# Patient Record
Sex: FEMALE | Race: WHITE | NOT HISPANIC OR LATINO | Employment: OTHER | URBAN - METROPOLITAN AREA
[De-identification: names, ages, dates, MRNs, and addresses within clinical notes are randomized per-mention and may not be internally consistent; named-entity substitution may affect disease eponyms.]

---

## 2017-02-13 ENCOUNTER — TRANSCRIBE ORDERS (OUTPATIENT)
Dept: ADMINISTRATIVE | Facility: HOSPITAL | Age: 62
End: 2017-02-13

## 2017-02-13 DIAGNOSIS — Z13.9 SCREENING: Primary | ICD-10-CM

## 2017-02-14 ENCOUNTER — TRANSCRIBE ORDERS (OUTPATIENT)
Dept: ADMINISTRATIVE | Facility: HOSPITAL | Age: 62
End: 2017-02-14

## 2017-02-14 ENCOUNTER — HOSPITAL ENCOUNTER (OUTPATIENT)
Dept: GENERAL RADIOLOGY | Facility: HOSPITAL | Age: 62
Discharge: HOME | End: 2017-02-14
Admitting: FAMILY MEDICINE

## 2017-02-14 DIAGNOSIS — S82.402A CLOSED FRACTURE OF SHAFT OF LEFT FIBULA, UNSPECIFIED FRACTURE MORPHOLOGY, INITIAL ENCOUNTER: ICD-10-CM

## 2017-02-14 DIAGNOSIS — S82.402A CLOSED FRACTURE OF SHAFT OF LEFT FIBULA, UNSPECIFIED FRACTURE MORPHOLOGY, INITIAL ENCOUNTER: Primary | ICD-10-CM

## 2017-02-14 PROCEDURE — 73610 X-RAY EXAM OF ANKLE: CPT

## 2017-02-16 ENCOUNTER — HOSPITAL ENCOUNTER (OUTPATIENT)
Dept: MAMMOGRAPHY | Facility: HOSPITAL | Age: 62
Discharge: HOME | End: 2017-02-16
Attending: OBSTETRICS & GYNECOLOGY | Admitting: OBSTETRICS & GYNECOLOGY

## 2017-02-16 DIAGNOSIS — Z13.9 SCREENING: ICD-10-CM

## 2017-02-16 PROCEDURE — G0202 SCR MAMMO BI INCL CAD: HCPCS

## 2017-02-16 PROCEDURE — 77063 BREAST TOMOSYNTHESIS BI: CPT

## 2017-05-16 ENCOUNTER — HOSPITAL ENCOUNTER (OUTPATIENT)
Dept: GENERAL RADIOLOGY | Facility: HOSPITAL | Age: 62
Discharge: HOME | End: 2017-05-16
Admitting: FAMILY MEDICINE

## 2017-05-16 ENCOUNTER — TRANSCRIBE ORDERS (OUTPATIENT)
Dept: ADMINISTRATIVE | Facility: HOSPITAL | Age: 62
End: 2017-05-16

## 2017-05-16 DIAGNOSIS — R52 PAIN: ICD-10-CM

## 2017-05-16 DIAGNOSIS — R52 PAIN: Primary | ICD-10-CM

## 2017-05-16 PROCEDURE — 73630 X-RAY EXAM OF FOOT: CPT

## 2018-05-02 ENCOUNTER — TRANSCRIBE ORDERS (OUTPATIENT)
Dept: ADMINISTRATIVE | Facility: HOSPITAL | Age: 63
End: 2018-05-02

## 2018-05-02 DIAGNOSIS — Z12.31 VISIT FOR SCREENING MAMMOGRAM: Primary | ICD-10-CM

## 2018-05-08 ENCOUNTER — HOSPITAL ENCOUNTER (OUTPATIENT)
Dept: MAMMOGRAPHY | Facility: HOSPITAL | Age: 63
Discharge: HOME | End: 2018-05-08
Attending: OBSTETRICS & GYNECOLOGY | Admitting: OBSTETRICS & GYNECOLOGY

## 2018-05-08 DIAGNOSIS — Z12.31 VISIT FOR SCREENING MAMMOGRAM: ICD-10-CM

## 2018-05-08 PROCEDURE — 77063 BREAST TOMOSYNTHESIS BI: CPT

## 2018-05-08 PROCEDURE — 77067 SCR MAMMO BI INCL CAD: CPT

## 2018-05-18 ENCOUNTER — OFFICE VISIT (OUTPATIENT)
Dept: OBSTETRICS AND GYNECOLOGY | Facility: CLINIC | Age: 63
End: 2018-05-18

## 2018-05-18 VITALS
SYSTOLIC BLOOD PRESSURE: 154 MMHG | WEIGHT: 190 LBS | HEIGHT: 64 IN | DIASTOLIC BLOOD PRESSURE: 90 MMHG | BODY MASS INDEX: 32.44 KG/M2

## 2018-05-18 DIAGNOSIS — Z11.51 SPECIAL SCREENING EXAMINATION FOR HUMAN PAPILLOMAVIRUS (HPV): ICD-10-CM

## 2018-05-18 DIAGNOSIS — R32 URINARY INCONTINENCE, UNSPECIFIED TYPE: ICD-10-CM

## 2018-05-18 DIAGNOSIS — Z01.419 PAP SMEAR, LOW-RISK: ICD-10-CM

## 2018-05-18 DIAGNOSIS — Z01.419 ENCOUNTER FOR GYNECOLOGICAL EXAMINATION WITHOUT ABNORMAL FINDING: Primary | ICD-10-CM

## 2018-05-18 LAB
BILIRUB BLD-MCNC: NEGATIVE MG/DL
CLARITY, POC: CLEAR
COLOR UR: YELLOW
GLUCOSE UR STRIP-MCNC: NEGATIVE MG/DL
KETONES UR QL: NEGATIVE
LEUKOCYTE EST, POC: NEGATIVE
NITRITE UR-MCNC: NEGATIVE MG/ML
PH UR: 5 [PH] (ref 5–8)
PROT UR STRIP-MCNC: NEGATIVE MG/DL
RBC # UR STRIP: NEGATIVE /UL
SP GR UR: 1 (ref 1–1.03)
UROBILINOGEN UR QL: NORMAL

## 2018-05-18 PROCEDURE — 81002 URINALYSIS NONAUTO W/O SCOPE: CPT | Performed by: OBSTETRICS & GYNECOLOGY

## 2018-05-18 PROCEDURE — 99386 PREV VISIT NEW AGE 40-64: CPT | Performed by: OBSTETRICS & GYNECOLOGY

## 2018-05-18 RX ORDER — ROSUVASTATIN CALCIUM 10 MG/1
10 TABLET, COATED ORAL DAILY
COMMUNITY
Start: 2018-05-07 | End: 2021-11-18 | Stop reason: SDUPTHER

## 2018-05-18 RX ORDER — OMEPRAZOLE 40 MG/1
40 CAPSULE, DELAYED RELEASE ORAL DAILY
COMMUNITY
Start: 2018-04-29 | End: 2018-11-27 | Stop reason: SDUPTHER

## 2018-05-18 RX ORDER — LISINOPRIL 20 MG/1
20 TABLET ORAL DAILY
COMMUNITY
Start: 2018-04-29 | End: 2020-08-10

## 2018-05-18 NOTE — PROGRESS NOTES
GYN Annual Exam     CC- Here for annual exam.     Gracie Topete is a 62 y.o. female who presents for annual well woman exam. Pt is a new pt to me. Pt has had a TVH with ovarian conservation. Pt has a TVT approx 20 years ago. Pt wears a pad all the time bc she still leaks urine. Pt has OAB symptoms with urinary frequency and urgency. Pt was using estrogen cream but was afraid to use bc she is afraid to cancer.    OB History      Para Term  AB Living    2 2 2          SAB TAB Ectopic Molar Multiple Live Births                         Current contraception: post menopausal status  History of abnormal Pap smear: no  History of abnormal mammogram: no  Family history of uterine, colon or ovarian cancer: no  Family history of breast cancer: no    Health Maintenance   Topic Date Due   • ANNUAL PHYSICAL  1958   • TDAP/TD VACCINES (1 - Tdap) 1974   • ZOSTER VACCINE (1 of 2) 2005   • HEPATITIS C SCREENING  2018   • LIPID PANEL  2018   • COLONOSCOPY  2018   • INFLUENZA VACCINE  2018   • MAMMOGRAM  2020   • PAP SMEAR  2021       Past Medical History:   Diagnosis Date   • Basal cell carcinoma of chest     NECK, LEGS   • GERD (gastroesophageal reflux disease)    • Hyperlipidemia    • Hypertension        Past Surgical History:   Procedure Laterality Date   • EYE SURGERY     • HYSTERECTOMY     • ROTATOR CUFF REPAIR     • SKIN TAG REMOVAL     • TUBAL ABDOMINAL LIGATION           Current Outpatient Prescriptions:   •  lisinopril (PRINIVIL,ZESTRIL) 20 MG tablet, , Disp: , Rfl:   •  omeprazole (priLOSEC) 40 MG capsule, , Disp: , Rfl:   •  rosuvastatin (CRESTOR) 10 MG tablet, , Disp: , Rfl:     No Known Allergies    Social History   Substance Use Topics   • Smoking status: Former Smoker   • Smokeless tobacco: Not on file   • Alcohol use No       History reviewed. No pertinent family history.    Review of Systems   Gastrointestinal: Negative for abdominal distention and  "abdominal pain.   Genitourinary: Positive for frequency and urgency. Negative for dysuria, vaginal bleeding, vaginal discharge and vaginal pain.       /90   Ht 162.6 cm (64\")   Wt 86.2 kg (190 lb)   Breastfeeding? No   BMI 32.61 kg/m²     Physical Exam   Constitutional: She is oriented to person, place, and time. She appears well-developed and well-nourished.   HENT:   Mouth/Throat: Normal dentition. No dental caries.   Cardiovascular: Normal rate and regular rhythm.    Pulmonary/Chest: Effort normal and breath sounds normal. Right breast exhibits no inverted nipple, no mass, no nipple discharge, no skin change and no tenderness. Left breast exhibits no inverted nipple, no mass, no nipple discharge, no skin change and no tenderness.   Abdominal: Soft. She exhibits no distension and no mass. There is no tenderness.   Genitourinary: There is no rash, tenderness or lesion on the right labia. There is no rash, tenderness or lesion on the left labia. Right adnexum displays no mass, no tenderness and no fullness. Left adnexum displays no mass, no tenderness and no fullness. No tenderness or bleeding in the vagina. No vaginal discharge found.   Genitourinary Comments: No evidence of mesh erosion   Neurological: She is alert and oriented to person, place, and time.   Psychiatric: She has a normal mood and affect. Her behavior is normal. Judgment and thought content normal.   Vitals reviewed.         Assessment/Plan    1) GYN HM: Check pap smear- hx of TVH. UTD with colonoscopy. MMG UTD. SBE demonstrated and encouraged.  2) Urinary incontinence: Hx of TVT. Pt has leakage that sounds like OAB symptoms. Pt declines medication at this time. Discussed starting meds if symptoms worsen for her. Pt was using estrogen cream to prevent erosion of TVT but stopped bc she worries about estrogen cream causing cancer. Dsicussed the limited systemic effects of estrogen cream  3) Bone health - Weight bearing exercise, dietary " calcium recommendations and vitamin D reviewed.   4) Diet and Exercise discussed  5) Smoking Status: nonsmoker  6) Social: Pt;s sister is also my pt.  7) Follow up prn and 1 year       Diagnoses and all orders for this visit:    Encounter for gynecological examination without abnormal finding    Pap smear, low-risk  -     POC Urinalysis Dipstick  -     Pap IG, HPV-hr    Special screening examination for human papillomavirus (HPV)  -     POC Urinalysis Dipstick  -     Pap IG, HPV-hr    Urinary incontinence, unspecified type    Other orders  -     lisinopril (PRINIVIL,ZESTRIL) 20 MG tablet;   -     omeprazole (priLOSEC) 40 MG capsule;   -     rosuvastatin (CRESTOR) 10 MG tablet;         Jannet Nesbitt,   5/24/2018  6:30 AM

## 2018-05-22 LAB
CYTOLOGIST CVX/VAG CYTO: NORMAL
CYTOLOGY CVX/VAG DOC THIN PREP: NORMAL
DX ICD CODE: NORMAL
HIV 1 & 2 AB SER-IMP: NORMAL
HPV I/H RISK 1 DNA CVX QL PROBE+SIG AMP: NEGATIVE
OTHER STN SPEC: NORMAL
PATH REPORT.FINAL DX SPEC: NORMAL
STAT OF ADQ CVX/VAG CYTO-IMP: NORMAL

## 2018-05-24 PROBLEM — R32 URINARY INCONTINENCE: Status: ACTIVE | Noted: 2018-05-24

## 2018-08-17 ENCOUNTER — TELEPHONE (OUTPATIENT)
Dept: GASTROENTEROLOGY | Facility: CLINIC | Age: 63
End: 2018-08-17

## 2018-08-17 PROBLEM — K22.70 BARRETT'S ESOPHAGUS WITHOUT DYSPLASIA: Status: ACTIVE | Noted: 2018-08-17

## 2018-08-21 ENCOUNTER — PREP FOR SURGERY (OUTPATIENT)
Dept: OTHER | Facility: HOSPITAL | Age: 63
End: 2018-08-21

## 2018-08-21 DIAGNOSIS — Z12.11 SCREENING FOR COLON CANCER: ICD-10-CM

## 2018-08-21 DIAGNOSIS — K22.70 BARRETT'S ESOPHAGUS WITHOUT DYSPLASIA: Primary | ICD-10-CM

## 2018-08-22 NOTE — TELEPHONE ENCOUNTER
LEAVING THIS WEEKEND FOR [place] AND WILL BE GONE FOR MONTH.  WOULD FOR US TO CALL HER BACK FIRST WEEK IN October.  SENT REMINDER TO CALL HER THEN.

## 2018-10-09 ENCOUNTER — PREP FOR SURGERY (OUTPATIENT)
Dept: OTHER | Facility: HOSPITAL | Age: 63
End: 2018-10-09

## 2018-10-09 DIAGNOSIS — K22.70 BARRETT'S ESOPHAGUS WITHOUT DYSPLASIA: Primary | ICD-10-CM

## 2018-10-09 DIAGNOSIS — Z12.11 SCREENING FOR COLON CANCER: ICD-10-CM

## 2018-10-23 ENCOUNTER — ANESTHESIA EVENT (OUTPATIENT)
Dept: GASTROENTEROLOGY | Facility: HOSPITAL | Age: 63
End: 2018-10-23

## 2018-10-23 ENCOUNTER — HOSPITAL ENCOUNTER (OUTPATIENT)
Facility: HOSPITAL | Age: 63
Setting detail: HOSPITAL OUTPATIENT SURGERY
Discharge: HOME | End: 2018-10-23
Attending: INTERNAL MEDICINE | Admitting: INTERNAL MEDICINE

## 2018-10-23 ENCOUNTER — ANESTHESIA (OUTPATIENT)
Dept: GASTROENTEROLOGY | Facility: HOSPITAL | Age: 63
End: 2018-10-23

## 2018-10-23 VITALS
SYSTOLIC BLOOD PRESSURE: 135 MMHG | HEIGHT: 64 IN | TEMPERATURE: 97.9 F | WEIGHT: 184.19 LBS | RESPIRATION RATE: 20 BRPM | HEART RATE: 75 BPM | OXYGEN SATURATION: 98 % | BODY MASS INDEX: 31.45 KG/M2 | DIASTOLIC BLOOD PRESSURE: 75 MMHG

## 2018-10-23 DIAGNOSIS — Z12.11 SCREENING FOR COLON CANCER: ICD-10-CM

## 2018-10-23 DIAGNOSIS — K22.70 BARRETT'S ESOPHAGUS WITHOUT DYSPLASIA: ICD-10-CM

## 2018-10-23 PROCEDURE — 88305 TISSUE EXAM BY PATHOLOGIST: CPT | Performed by: INTERNAL MEDICINE

## 2018-10-23 PROCEDURE — 43239 EGD BIOPSY SINGLE/MULTIPLE: CPT | Performed by: INTERNAL MEDICINE

## 2018-10-23 PROCEDURE — 45380 COLONOSCOPY AND BIOPSY: CPT | Performed by: INTERNAL MEDICINE

## 2018-10-23 PROCEDURE — 25010000002 PROPOFOL 10 MG/ML EMULSION: Performed by: ANESTHESIOLOGY

## 2018-10-23 RX ORDER — SODIUM CHLORIDE 0.9 % (FLUSH) 0.9 %
3 SYRINGE (ML) INJECTION EVERY 12 HOURS SCHEDULED
Status: DISCONTINUED | OUTPATIENT
Start: 2018-10-23 | End: 2018-10-23 | Stop reason: HOSPADM

## 2018-10-23 RX ORDER — SODIUM CHLORIDE 0.9 % (FLUSH) 0.9 %
3-10 SYRINGE (ML) INJECTION AS NEEDED
Status: DISCONTINUED | OUTPATIENT
Start: 2018-10-23 | End: 2018-10-23 | Stop reason: HOSPADM

## 2018-10-23 RX ORDER — LIDOCAINE HYDROCHLORIDE 20 MG/ML
INJECTION, SOLUTION INFILTRATION; PERINEURAL AS NEEDED
Status: DISCONTINUED | OUTPATIENT
Start: 2018-10-23 | End: 2018-10-23 | Stop reason: SURG

## 2018-10-23 RX ORDER — SODIUM CHLORIDE, SODIUM LACTATE, POTASSIUM CHLORIDE, CALCIUM CHLORIDE 600; 310; 30; 20 MG/100ML; MG/100ML; MG/100ML; MG/100ML
30 INJECTION, SOLUTION INTRAVENOUS CONTINUOUS PRN
Status: DISCONTINUED | OUTPATIENT
Start: 2018-10-23 | End: 2018-10-23 | Stop reason: HOSPADM

## 2018-10-23 RX ORDER — PROPOFOL 10 MG/ML
VIAL (ML) INTRAVENOUS CONTINUOUS PRN
Status: DISCONTINUED | OUTPATIENT
Start: 2018-10-23 | End: 2018-10-23 | Stop reason: SURG

## 2018-10-23 RX ORDER — GLYCOPYRROLATE 0.2 MG/ML
INJECTION INTRAMUSCULAR; INTRAVENOUS AS NEEDED
Status: DISCONTINUED | OUTPATIENT
Start: 2018-10-23 | End: 2018-10-23 | Stop reason: SURG

## 2018-10-23 RX ORDER — PROPOFOL 10 MG/ML
VIAL (ML) INTRAVENOUS AS NEEDED
Status: DISCONTINUED | OUTPATIENT
Start: 2018-10-23 | End: 2018-10-23 | Stop reason: SURG

## 2018-10-23 RX ADMIN — PROPOFOL 140 MCG/KG/MIN: 10 INJECTION, EMULSION INTRAVENOUS at 08:11

## 2018-10-23 RX ADMIN — PROPOFOL 20 MG: 10 INJECTION, EMULSION INTRAVENOUS at 08:15

## 2018-10-23 RX ADMIN — GLYCOPYRROLATE 0.1 MCG: 0.2 INJECTION INTRAMUSCULAR; INTRAVENOUS at 07:59

## 2018-10-23 RX ADMIN — SODIUM CHLORIDE, POTASSIUM CHLORIDE, SODIUM LACTATE AND CALCIUM CHLORIDE 30 ML/HR: 600; 310; 30; 20 INJECTION, SOLUTION INTRAVENOUS at 07:40

## 2018-10-23 RX ADMIN — PROPOFOL 100 MG: 10 INJECTION, EMULSION INTRAVENOUS at 08:11

## 2018-10-23 RX ADMIN — PROPOFOL 30 MG: 10 INJECTION, EMULSION INTRAVENOUS at 08:13

## 2018-10-23 RX ADMIN — LIDOCAINE HYDROCHLORIDE 40 MG: 20 INJECTION, SOLUTION INFILTRATION; PERINEURAL at 07:59

## 2018-10-23 NOTE — ANESTHESIA POSTPROCEDURE EVALUATION
Patient: Gracie Topete    Procedure Summary     Date:  10/23/18 Room / Location:  [facility] ENDOSCOPY 9 /  [facility]    Anesthesia Start:  0757 Anesthesia Stop:  0853    Procedures:       ESOPHAGOGASTRODUODENOSCOPY WITH COLD BIOPSIES (N/A Esophagus)      COLONOSCOPY INTO CECUM AND TERMINAL ILEUM WITH COLD BIOPSY POLYPECTOMIES (N/A ) Diagnosis:       Screening for colon cancer      Murphy's esophagus without dysplasia      Colon polyp      Diverticulosis      (Screening for colon cancer [Z12.11])      (Murphy's esophagus without dysplasia [K22.70])    Surgeon:  Med Walalce MD Provider:  Ellen Jeter MD    Anesthesia Type:  MAC ASA Status:  2          Anesthesia Type: MAC  Last vitals  BP   110/73 (10/23/18 0847)   Temp   36.8 °C (98.2 °F) (10/23/18 0718)   Pulse   88 (10/23/18 0847)   Resp   20 (10/23/18 0847)     SpO2   94 % (10/23/18 0847)     Post Anesthesia Care and Evaluation    Patient location during evaluation: bedside  Patient participation: complete - patient participated  Level of consciousness: awake and alert  Pain management: adequate  Airway patency: patent  Anesthetic complications: No anesthetic complications  PONV Status: controlled  Cardiovascular status: acceptable  Respiratory status: acceptable  Hydration status: acceptable

## 2018-10-23 NOTE — H&P
"Patient Care Team:  Noah Valdez MD as PCP - General (Family Medicine)    CHIEF COMPLAINT: Murphy's, Family history of colon Polyps    HISTORY OF PRESENT ILLNESS:    Last colon was 2012 and last EGD was 2015      Past Medical History:   Diagnosis Date   • Murphy esophagus    • Basal cell carcinoma of chest     NECK, LEGS   • GERD (gastroesophageal reflux disease)    • Hyperlipidemia    • Hypertension      Past Surgical History:   Procedure Laterality Date   • COLONOSCOPY  2008   • EYE SURGERY     • HYSTERECTOMY     • ROTATOR CUFF REPAIR     • SKIN TAG REMOVAL     • TUBAL ABDOMINAL LIGATION       Family History   Problem Relation Age of Onset   • Colon polyps Mother    • Colon polyps Father    • Malig Hyperthermia Neg Hx      Social History   Substance Use Topics   • Smoking status: Former Smoker   • Smokeless tobacco: Never Used   • Alcohol use No     Prescriptions Prior to Admission   Medication Sig Dispense Refill Last Dose   • Cetirizine HCl (KLS ALLER-HUDSON PO) Take 1 tablet by mouth Daily.   10/22/2018   • lisinopril (PRINIVIL,ZESTRIL) 20 MG tablet Take 20 mg by mouth Daily.   10/21/2018   • omeprazole (priLOSEC) 40 MG capsule Take 40 mg by mouth Daily.   10/23/2018 at 0500   • rosuvastatin (CRESTOR) 10 MG tablet Take 10 mg by mouth Daily.   10/20/2018     Allergies:  Patient has no known allergies.    REVIEW OF SYSTEMS:  Please see the above history of present illness for pertinent positives and negatives.  The remainder of the patient's systems have been reviewed and are negative.     Vital Signs  Temp:  [98.2 °F (36.8 °C)] 98.2 °F (36.8 °C)  Heart Rate:  [81] 81  Resp:  [14] 14  BP: (151)/(91) 151/91    Flowsheet Rows      First Filed Value   Admission Height  162.5 cm (63.98\") Documented at 10/22/2018 1207   Admission Weight  83.5 kg (184 lb 3 oz) Documented at 10/23/2018 0718           Physical Exam:  Physical Exam   Constitutional: Patient appears well-developed and well-nourished and in no acute " distress   HEENT:   Head: Normocephalic and atraumatic.   Eyes:  Pupils are equal, round, and reactive to light. EOM are intact. Sclera are anicteric and non-injected.  Mouth and Throat: Patient has moist mucous membranes. Oropharynx is clear of any erythema or exudate.     Neck: Neck supple. No JVD present. No thyromegaly present. No lymphadenopathy present.  Cardiovascular: Regular rate, regular rhythm, S1 normal and S2 normal.  Exam reveals no gallop and no friction rub.  No murmur heard.  Pulmonary/Chest: Lungs are clear to auscultation bilaterally. No respiratory distress. No wheezes. No rhonchi. No rales.   Abdominal: Soft. Bowel sounds are normal. No distension and no mass. There is no hepatosplenomegaly. There is no tenderness.   Musculoskeletal: Normal Muscle tone  Extremities: No edema. Pulses are palpable in all 4 extremities.  Neurological: Patient is alert and oriented to person, place, and time. Cranial nerves II-XII are grossly intact with no focal deficits.  Skin: Skin is warm. No rash noted. Nails show no clubbing.  No cyanosis or erythema.    Debilities/Disabilities Identified: None  Emotional Behavior: Appropriate     Results Review:    I reviewed the patient's new clinical results.  Lab Results (most recent)     None          Imaging Results (most recent)     None        reviewed    ECG/EMG Results (most recent)     None        reviewed    Assessment/Plan     Baret's Esophagus  Family history of colon Polyps    EGD/Colonoscopy    I discussed the patients findings and my recommendations with patient.     Med Wallace MD  10/23/18  8:13 AM    Time: 10 min prior to procedure.

## 2018-10-23 NOTE — BRIEF OP NOTE
ESOPHAGOGASTRODUODENOSCOPY, COLONOSCOPY  Progress Note    Gracie Topete  10/23/2018    Pre-op Diagnosis:   Screening for colon cancer [Z12.11]  Murphy's esophagus without dysplasia [K22.70]       Post-Op Diagnosis Codes:     * Screening for colon cancer [Z12.11]     * Murphy's esophagus without dysplasia [K22.70]     * Colon polyp [K63.5]     * Diverticulosis [K57.90]    Procedure/CPT® Codes:      Procedure(s):  ESOPHAGOGASTRODUODENOSCOPY WITH COLD BIOPSIES  COLONOSCOPY INTO CECUM AND TERMINAL ILEUM WITH COLD BIOPSY POLYPECTOMIES    Surgeon(s):  Med Wallace MD    Anesthesia: Monitor Anesthesia Care    Staff:   Endo Technician: Shari Salinas  Endo Nurse: Gail Onofre RN    Estimated Blood Loss: none    Urine Voided: * No values recorded between 10/23/2018  8:00 AM and 10/23/2018  8:42 AM *    Specimens:                ID Type Source Tests Collected by Time   A : DISTAL ESOPHAGEAL BIOPSIES Tissue Esophagus, Distal TISSUE PATHOLOGY EXAM Med Wallace MD 10/23/2018 0818   B : ASCENDING COLON POLYP X2 Tissue Large Intestine, Right / Ascending Colon TISSUE PATHOLOGY EXAM Med Wallace MD 10/23/2018 0830   C : SIGMOID COLON POLYP X 2 Tissue Large Intestine, Sigmoid Colon TISSUE PATHOLOGY EXAM Med Wallace MD 10/23/2018 0838   D : RECTAL POLYP Polyp Large Intestine, Rectum TISSUE PATHOLOGY EXAM Med Wallace MD 10/23/2018 0841         Drains:      Findings: Normal Duodenum  Normal Stomach  Short Segment Murphy's Esophagus-Biopsy    Colon to TI Good Prep  Polyps (5) Cold Biopsy  Diverticulosis    Complications: None      Med Wallace MD     Date: 10/23/2018  Time: 8:43 AM

## 2018-10-24 LAB
CYTO UR: NORMAL
LAB AP CASE REPORT: NORMAL
PATH REPORT.FINAL DX SPEC: NORMAL
PATH REPORT.GROSS SPEC: NORMAL

## 2018-11-27 RX ORDER — OMEPRAZOLE 40 MG/1
40 CAPSULE, DELAYED RELEASE ORAL DAILY
Qty: 90 CAPSULE | Refills: 3 | Status: SHIPPED | OUTPATIENT
Start: 2018-11-27 | End: 2019-11-19 | Stop reason: SDUPTHER

## 2019-05-16 ENCOUNTER — TRANSCRIBE ORDERS (OUTPATIENT)
Dept: ADMINISTRATIVE | Facility: HOSPITAL | Age: 64
End: 2019-05-16

## 2019-05-16 DIAGNOSIS — Z12.31 SCREENING MAMMOGRAM, ENCOUNTER FOR: Primary | ICD-10-CM

## 2019-05-23 ENCOUNTER — OFFICE VISIT (OUTPATIENT)
Dept: OBSTETRICS AND GYNECOLOGY | Facility: CLINIC | Age: 64
End: 2019-05-23

## 2019-05-23 VITALS
BODY MASS INDEX: 31.41 KG/M2 | DIASTOLIC BLOOD PRESSURE: 70 MMHG | SYSTOLIC BLOOD PRESSURE: 124 MMHG | HEIGHT: 64 IN | WEIGHT: 184 LBS

## 2019-05-23 DIAGNOSIS — N32.81 OAB (OVERACTIVE BLADDER): ICD-10-CM

## 2019-05-23 DIAGNOSIS — Z01.419 WELL WOMAN EXAM WITH ROUTINE GYNECOLOGICAL EXAM: Primary | ICD-10-CM

## 2019-05-23 LAB
BILIRUB BLD-MCNC: NEGATIVE MG/DL
CLARITY, POC: CLEAR
COLOR UR: YELLOW
GLUCOSE UR STRIP-MCNC: NEGATIVE MG/DL
KETONES UR QL: NEGATIVE
LEUKOCYTE EST, POC: NEGATIVE
NITRITE UR-MCNC: NEGATIVE MG/ML
PH UR: 6.5 [PH] (ref 5–8)
PROT UR STRIP-MCNC: NEGATIVE MG/DL
RBC # UR STRIP: NEGATIVE /UL
SP GR UR: 1.02 (ref 1–1.03)
UROBILINOGEN UR QL: NORMAL

## 2019-05-23 PROCEDURE — 99396 PREV VISIT EST AGE 40-64: CPT | Performed by: OBSTETRICS & GYNECOLOGY

## 2019-05-23 PROCEDURE — 81002 URINALYSIS NONAUTO W/O SCOPE: CPT | Performed by: OBSTETRICS & GYNECOLOGY

## 2019-05-23 NOTE — PROGRESS NOTES
GYN Annual Exam     CC- Here for annual exam.     Gracie Topete is a 63 y.o. female who presents for annual well woman exam. Pt has had a TVH with ovarian conservation. Pt has a TVT approx 20 years ago. Pt wears a pad all the time bc she still leaks urine. Pt has OAB symptoms with urinary frequency and urgency.Pt was not interested in starting meds last year but is interested now. Reports she has started drinking less water to control her urinary frequency.    OB History      Para Term  AB Living    2 2 2          SAB TAB Ectopic Molar Multiple Live Births                         Current contraception: post menopausal status  History of abnormal Pap smear: no  History of abnormal mammogram: no  Family history of uterine, colon or ovarian cancer: no  Family history of breast cancer: no        Health Maintenance   Topic Date Due   • ANNUAL PHYSICAL  1958   • TDAP/TD VACCINES (1 - Tdap) 1974   • ZOSTER VACCINE (1 of 2) 2005   • HEPATITIS C SCREENING  2018   • LIPID PANEL  2018   • INFLUENZA VACCINE  2019   • MAMMOGRAM  2020   • ANNUAL GYN EXAM  2020   • PAP SMEAR  2021   • COLONOSCOPY  10/23/2023       Past Medical History:   Diagnosis Date   • Murphy esophagus    • Basal cell carcinoma of chest     NECK, LEGS   • GERD (gastroesophageal reflux disease)    • Hyperlipidemia    • Hypertension        Past Surgical History:   Procedure Laterality Date   • COLONOSCOPY     • COLONOSCOPY N/A 10/23/2018    Procedure: COLONOSCOPY INTO CECUM AND TERMINAL ILEUM WITH COLD BIOPSY POLYPECTOMIES;  Surgeon: Med Wallace MD;  Location: [facility] ENDOSCOPY;  Service: Gastroenterology   • ENDOSCOPY N/A 10/23/2018    Procedure: ESOPHAGOGASTRODUODENOSCOPY WITH COLD BIOPSIES;  Surgeon: Med Wallace MD;  Location: [facility] ENDOSCOPY;  Service: Gastroenterology   • EYE SURGERY     • HYSTERECTOMY     • ROTATOR CUFF REPAIR     • SKIN TAG REMOVAL    "  • TUBAL ABDOMINAL LIGATION           Current Outpatient Medications:   •  Cetirizine HCl (KLS ALLER-HUDSON PO), Take 1 tablet by mouth Daily., Disp: , Rfl:   •  lisinopril (PRINIVIL,ZESTRIL) 20 MG tablet, Take 20 mg by mouth Daily., Disp: , Rfl:   •  omeprazole (priLOSEC) 40 MG capsule, Take 1 capsule by mouth Daily., Disp: 90 capsule, Rfl: 3  •  rosuvastatin (CRESTOR) 10 MG tablet, Take 10 mg by mouth Daily., Disp: , Rfl:     No Known Allergies    Social History     Tobacco Use   • Smoking status: Former Smoker   • Smokeless tobacco: Never Used   Substance Use Topics   • Alcohol use: No   • Drug use: No       Family History   Problem Relation Age of Onset   • Colon polyps Mother    • Colon polyps Father    • Malig Hyperthermia Neg Hx        Review of Systems   Gastrointestinal: Negative for abdominal distention, abdominal pain, anal bleeding and blood in stool.   Genitourinary: Positive for frequency. Negative for difficulty urinating, dyspareunia, dysuria, vaginal bleeding and vaginal discharge.       /70   Ht 162.6 cm (64.02\")   Wt 83.5 kg (184 lb)   BMI 31.57 kg/m²     Physical Exam   Constitutional: She is oriented to person, place, and time. She appears well-developed and well-nourished.   HENT:   Mouth/Throat: Normal dentition. No dental caries.   Cardiovascular: Normal rate and regular rhythm.   Pulmonary/Chest: Effort normal and breath sounds normal. Right breast exhibits no inverted nipple, no mass, no nipple discharge, no skin change and no tenderness. Left breast exhibits no inverted nipple, no mass, no nipple discharge, no skin change and no tenderness.   Abdominal: Soft. She exhibits no distension and no mass. There is no tenderness.   Genitourinary: There is no rash, tenderness or lesion on the right labia. There is no rash, tenderness or lesion on the left labia. Right adnexum displays no mass, no tenderness and no fullness. Left adnexum displays no mass, no tenderness and no fullness. No " tenderness or bleeding in the vagina. No vaginal discharge found.   Neurological: She is alert and oriented to person, place, and time.   Psychiatric: She has a normal mood and affect. Her behavior is normal. Judgment and thought content normal.   Vitals reviewed.         Assessment/Plan    1) GYN HM: Check pap smear- hx of TVH. UTD with colonoscopy: had 5 polyps this time and needs another colonoscopy in 5 years. MMG next week. SBE demonstrated and encouraged.  2) Urinary incontinence: Hx of TVT. Pt has leakage that sounds like OAB symptoms. Pt is interested in trying samples of Myrbetriq. 30 days of samples given. Pt to call if she desirers a prescription.  3) Bone health - Weight bearing exercise, dietary calcium recommendations and vitamin D reviewed. Bone density normal 2015. Will need a repeat bone density at age 65.  4) Diet and Exercise discussed  5) Smoking Status: nonsmoker  6) Social: Pt's sister is also my pt.  7) Follow up prn and 1 year           Diagnoses and all orders for this visit:    Well woman exam with routine gynecological exam  -     POC Urinalysis Dipstick    OAB (overactive bladder)        Jannet Nesbitt DO  5/26/2019  2:21 PM

## 2019-05-28 ENCOUNTER — HOSPITAL ENCOUNTER (OUTPATIENT)
Dept: MAMMOGRAPHY | Facility: HOSPITAL | Age: 64
Discharge: HOME | End: 2019-05-28
Admitting: FAMILY MEDICINE

## 2019-05-28 DIAGNOSIS — Z12.31 SCREENING MAMMOGRAM, ENCOUNTER FOR: ICD-10-CM

## 2019-05-28 PROCEDURE — 77063 BREAST TOMOSYNTHESIS BI: CPT

## 2019-05-28 PROCEDURE — 77067 SCR MAMMO BI INCL CAD: CPT

## 2019-11-19 RX ORDER — OMEPRAZOLE 40 MG/1
40 CAPSULE, DELAYED RELEASE ORAL DAILY
Qty: 90 CAPSULE | Refills: 3 | Status: SHIPPED | OUTPATIENT
Start: 2019-11-19 | End: 2020-11-16

## 2020-05-12 ENCOUNTER — TRANSCRIBE ORDERS (OUTPATIENT)
Dept: ADMINISTRATIVE | Facility: HOSPITAL | Age: 65
End: 2020-05-12

## 2020-05-12 DIAGNOSIS — Z78.0 POSTMENOPAUSAL: Primary | ICD-10-CM

## 2020-05-20 ENCOUNTER — APPOINTMENT (OUTPATIENT)
Dept: BONE DENSITY | Facility: HOSPITAL | Age: 65
End: 2020-05-20

## 2020-05-26 ENCOUNTER — APPOINTMENT (OUTPATIENT)
Dept: BONE DENSITY | Facility: HOSPITAL | Age: 65
End: 2020-05-26

## 2020-05-26 ENCOUNTER — TRANSCRIBE ORDERS (OUTPATIENT)
Dept: ADMINISTRATIVE | Facility: HOSPITAL | Age: 65
End: 2020-05-26

## 2020-05-26 DIAGNOSIS — Z12.31 ENCOUNTER FOR SCREENING MAMMOGRAM FOR BREAST CANCER: Primary | ICD-10-CM

## 2020-05-26 DIAGNOSIS — Z78.0 POSTMENOPAUSAL: ICD-10-CM

## 2020-05-26 PROCEDURE — 77080 DXA BONE DENSITY AXIAL: CPT

## 2020-06-02 ENCOUNTER — HOSPITAL ENCOUNTER (OUTPATIENT)
Dept: MAMMOGRAPHY | Facility: HOSPITAL | Age: 65
Discharge: HOME | End: 2020-06-02
Admitting: FAMILY MEDICINE

## 2020-06-02 DIAGNOSIS — Z12.31 ENCOUNTER FOR SCREENING MAMMOGRAM FOR BREAST CANCER: ICD-10-CM

## 2020-06-02 PROCEDURE — 77063 BREAST TOMOSYNTHESIS BI: CPT

## 2020-06-02 PROCEDURE — 77067 SCR MAMMO BI INCL CAD: CPT

## 2020-08-10 ENCOUNTER — OFFICE VISIT (OUTPATIENT)
Dept: OBSTETRICS AND GYNECOLOGY | Facility: CLINIC | Age: 65
End: 2020-08-10

## 2020-08-10 VITALS
BODY MASS INDEX: 32.66 KG/M2 | WEIGHT: 191.3 LBS | HEIGHT: 64 IN | DIASTOLIC BLOOD PRESSURE: 80 MMHG | SYSTOLIC BLOOD PRESSURE: 124 MMHG

## 2020-08-10 DIAGNOSIS — N39.41 URGE INCONTINENCE OF URINE: ICD-10-CM

## 2020-08-10 DIAGNOSIS — Z01.419 ROUTINE GYNECOLOGICAL EXAMINATION: Primary | ICD-10-CM

## 2020-08-10 DIAGNOSIS — Z01.419 PAP SMEAR, LOW-RISK: ICD-10-CM

## 2020-08-10 DIAGNOSIS — Z13.9 SCREENING FOR CONDITION: ICD-10-CM

## 2020-08-10 PROCEDURE — 81002 URINALYSIS NONAUTO W/O SCOPE: CPT | Performed by: OBSTETRICS & GYNECOLOGY

## 2020-08-10 PROCEDURE — 99396 PREV VISIT EST AGE 40-64: CPT | Performed by: OBSTETRICS & GYNECOLOGY

## 2020-08-10 RX ORDER — ESTRADIOL 0.1 MG/G
1 CREAM VAGINAL 2 TIMES WEEKLY
Qty: 45 G | Refills: 6 | Status: SHIPPED | OUTPATIENT
Start: 2020-08-10

## 2020-08-10 RX ORDER — OLMESARTAN MEDOXOMIL AND HYDROCHLOROTHIAZIDE 40/12.5 40; 12.5 MG/1; MG/1
TABLET ORAL
COMMUNITY
Start: 2020-05-16

## 2020-08-10 NOTE — PROGRESS NOTES
GYN Annual Exam     CC- Here for annual exam.     Gracie Topete is a 64 y.o. female who presents for annual well woman exam. Pt has had a TVH with ovarian conservation. Pt has a TVT approx 20 years ago.  At her last annual exam she complained of urinary frequency and leakage.  She was given samples of Myrbetriq.  Patient reports that the Myrbetriq helped but she never called for prescription.  She is asking to be started on the Myrbetriq.  Additionally, patient reports that she intermittently has the pain in her vagina and she is wondering if she is having any mesh erosion from her TVT.  Patient has a history of being on estrogen cream per vagina but states that she has not used it in quite a while and is asking for a refill.    OB History        2    Para   2    Term   2            AB        Living           SAB        TAB        Ectopic        Molar        Multiple        Live Births                    Current contraception: post menopausal status  History of abnormal Pap smear: no  History of abnormal mammogram: no  Family history of uterine, colon or ovarian cancer: no  Family history of breast cancer: no    Health Maintenance   Topic Date Due   • ANNUAL PHYSICAL  1958   • TDAP/TD VACCINES (1 - Tdap) 1966   • ZOSTER VACCINE (1 of 2) 2005   • HEPATITIS C SCREENING  2018   • LIPID PANEL  2018   • Annual Gynecologic Pelvic and Breast Exam  2020   • INFLUENZA VACCINE  2020   • PAP SMEAR  2021   • MAMMOGRAM  2022   • COLONOSCOPY  10/23/2023       Past Medical History:   Diagnosis Date   • Murphy esophagus    • Basal cell carcinoma of chest     NECK, LEGS   • GERD (gastroesophageal reflux disease)    • Hyperlipidemia    • Hypertension        Past Surgical History:   Procedure Laterality Date   • COLONOSCOPY     • COLONOSCOPY N/A 10/23/2018    Procedure: COLONOSCOPY INTO CECUM AND TERMINAL ILEUM WITH COLD BIOPSY POLYPECTOMIES;  Surgeon:  "Med Wallace MD;  Location: [facility] ENDOSCOPY;  Service: Gastroenterology   • ENDOSCOPY N/A 10/23/2018    Procedure: ESOPHAGOGASTRODUODENOSCOPY WITH COLD BIOPSIES;  Surgeon: Med Wallace MD;  Location: [facility] ENDOSCOPY;  Service: Gastroenterology   • EYE SURGERY     • HYSTERECTOMY     • ROTATOR CUFF REPAIR     • SKIN TAG REMOVAL     • TUBAL ABDOMINAL LIGATION           Current Outpatient Medications:   •  Cetirizine HCl (KLS ALLER-HUDSON PO), Take 1 tablet by mouth Daily., Disp: , Rfl:   •  estradiol (ESTRACE) 0.1 MG/GM vaginal cream, Insert 1 g into the vagina 2 (Two) Times a Week., Disp: 45 g, Rfl: 6  •  olmesartan-hydrochlorothiazide (BENICAR HCT) 40-12.5 MG per tablet, , Disp: , Rfl:   •  omeprazole (priLOSEC) 40 MG capsule, Take 1 capsule by mouth Daily., Disp: 90 capsule, Rfl: 3  •  rosuvastatin (CRESTOR) 10 MG tablet, Take 10 mg by mouth Daily., Disp: , Rfl:     No Known Allergies    Social History     Tobacco Use   • Smoking status: Former Smoker   • Smokeless tobacco: Never Used   Substance Use Topics   • Alcohol use: No   • Drug use: No       Family History   Problem Relation Age of Onset   • Colon polyps Mother    • Colon polyps Father    • Malig Hyperthermia Neg Hx    • Breast cancer Neg Hx        Review of Systems   Constitutional: Negative for appetite change, chills, fatigue, fever and unexpected weight change.   Gastrointestinal: Negative for abdominal distention, abdominal pain, anal bleeding, blood in stool, constipation, diarrhea, nausea and vomiting.   Genitourinary: Positive for frequency and vaginal pain. Negative for dyspareunia, dysuria, menstrual problem, pelvic pain, vaginal bleeding and vaginal discharge.       /80   Ht 162.6 cm (64.02\")   Wt 86.8 kg (191 lb 4.8 oz)   LMP  (LMP Unknown)   Breastfeeding No   BMI 32.82 kg/m²     Physical Exam   Constitutional: She is oriented to person, place, and time. She appears well-developed and well-nourished. "   HENT:   Mouth/Throat: Normal dentition. No dental caries.   Cardiovascular: Normal rate, regular rhythm and normal heart sounds.   Pulmonary/Chest: Effort normal and breath sounds normal. No stridor. No respiratory distress. She has no wheezes. Right breast exhibits no inverted nipple, no mass, no nipple discharge, no skin change and no tenderness. Left breast exhibits no inverted nipple, no mass, no nipple discharge, no skin change and no tenderness.   Abdominal: Soft. She exhibits no distension and no mass. There is no tenderness.   Genitourinary: There is no rash, tenderness or lesion on the right labia. There is no rash, tenderness or lesion on the left labia. Right adnexum displays no mass, no tenderness and no fullness. Left adnexum displays no mass, no tenderness and no fullness. There is tenderness in the vagina. No bleeding in the vagina. No vaginal discharge found.   Genitourinary Comments: Area of irregular feeling tissue at left side of vagina where mesh would be located. No mesh erosion noted.    Musculoskeletal: Normal range of motion. She exhibits no edema or tenderness.   Neurological: She is alert and oriented to person, place, and time. No cranial nerve deficit. Coordination normal.   Skin: Skin is warm. No rash noted. No erythema.   Psychiatric: She has a normal mood and affect. Her behavior is normal. Judgment and thought content normal.   Vitals reviewed.         Assessment/Plan    1) GYN HM: Check pap smear- hx of TVH. UTD with colonoscopy: had 5 polyps 10/2018 and needs another colonoscopy in 5 years. MMG 6/2020.  2) Urinary incontinence: Hx of TVT. Pt has leakage that sounds like OAB symptoms.  Patient was started on Myrbetriq last year and given samples.  She reports improved symptoms on the Myrbetriq.  She never called prescription but is asking for one now.    3) Bone health - Weight bearing exercise, dietary calcium recommendations and vitamin D reviewed. Bone density normal 2015. Will  need a repeat bone density at age 65.  4) Diet and Exercise discussed  5) Smoking Status: nonsmoker  6) Social: Pt's sister is also my pt.  7) TVT: Patient has a history of a TVT.  She is some irregular feeling tissue on the left side where a TVT would be placed but mesh erosion noted.  Will start patient back on estradiol cream 2 times per week to prevent any erosion.  8) Follow up prn and 1 year       Diagnoses and all orders for this visit:    Routine gynecological examination  -     Pap IG, HPV-hr    Screening for condition  -     POC Urinalysis Dipstick    Pap smear, low-risk  -     Pap IG, HPV-hr    Urge incontinence of urine    Other orders  -     olmesartan-hydrochlorothiazide (BENICAR HCT) 40-12.5 MG per tablet  -     estradiol (ESTRACE) 0.1 MG/GM vaginal cream; Insert 1 g into the vagina 2 (Two) Times a Week.        Jannet Nesbitt DO  8/11/2020  07:16

## 2020-08-12 LAB
CYTOLOGIST CVX/VAG CYTO: NORMAL
CYTOLOGY CVX/VAG DOC CYTO: NORMAL
CYTOLOGY CVX/VAG DOC THIN PREP: NORMAL
DX ICD CODE: NORMAL
HIV 1 & 2 AB SER-IMP: NORMAL
HPV I/H RISK 1 DNA CVX QL PROBE+SIG AMP: NEGATIVE
OTHER STN SPEC: NORMAL
STAT OF ADQ CVX/VAG CYTO-IMP: NORMAL

## 2020-09-23 ENCOUNTER — TELEPHONE (OUTPATIENT)
Dept: OBSTETRICS AND GYNECOLOGY | Facility: CLINIC | Age: 65
End: 2020-09-23

## 2020-09-23 NOTE — TELEPHONE ENCOUNTER
Pt called in stating that you had told her at last appt that you were referring her to see a Urologist, she has not received any calls from anyone and I did not see anything in the notes.  Could you take a look and see if she needs to go or not please?  Thanks.

## 2020-09-23 NOTE — TELEPHONE ENCOUNTER
Sounds like there is some confusion.  I was under the impression that she wanted to try the Myrbetriq first prior to proceeding with a urogynecology consult.  If she would like to proceed with that consult I am happy to put it in for her.

## 2020-09-25 ENCOUNTER — TELEPHONE (OUTPATIENT)
Dept: OBSTETRICS AND GYNECOLOGY | Facility: CLINIC | Age: 65
End: 2020-09-25

## 2020-09-25 DIAGNOSIS — N36.1 URETHRA, DIVERTICULUM: Primary | ICD-10-CM

## 2020-09-25 NOTE — TELEPHONE ENCOUNTER
Yes, I will prescribe the Myrbetriq. And now I know what she is talking about! Tell her I am so sorry for all the confusion. I would like her to see a Urologist. I am putting in refill. Medication sent.

## 2020-09-25 NOTE — TELEPHONE ENCOUNTER
Pt would like a script called in for Myrbetriq.      She also stated that she was to be referred to someone for possible diverticuli of the bladder.     Please advise.

## 2020-10-29 ENCOUNTER — TRANSCRIBE ORDERS (OUTPATIENT)
Dept: CT IMAGING | Facility: HOSPITAL | Age: 65
End: 2020-10-29

## 2020-10-29 DIAGNOSIS — N28.1 ACQUIRED CYST OF KIDNEY: Primary | ICD-10-CM

## 2020-11-12 ENCOUNTER — HOSPITAL ENCOUNTER (OUTPATIENT)
Dept: CT IMAGING | Facility: HOSPITAL | Age: 65
Discharge: HOME | End: 2020-11-12
Admitting: UROLOGY

## 2020-11-12 DIAGNOSIS — N28.1 ACQUIRED CYST OF KIDNEY: ICD-10-CM

## 2020-11-12 PROCEDURE — 0 IOPAMIDOL PER 1 ML: Performed by: UROLOGY

## 2020-11-12 PROCEDURE — 74178 CT ABD&PLV WO CNTR FLWD CNTR: CPT

## 2020-11-12 RX ADMIN — IOPAMIDOL 100 ML: 755 INJECTION, SOLUTION INTRAVENOUS at 10:43

## 2020-11-16 RX ORDER — OMEPRAZOLE 40 MG/1
CAPSULE, DELAYED RELEASE ORAL
Qty: 90 CAPSULE | Refills: 3 | Status: SHIPPED | OUTPATIENT
Start: 2020-11-16 | End: 2021-11-08

## 2021-05-13 ENCOUNTER — TRANSCRIBE ORDERS (OUTPATIENT)
Dept: ADMINISTRATIVE | Facility: HOSPITAL | Age: 66
End: 2021-05-13

## 2021-05-13 DIAGNOSIS — Z12.39 SCREENING BREAST EXAMINATION: Primary | ICD-10-CM

## 2021-06-04 ENCOUNTER — HOSPITAL ENCOUNTER (OUTPATIENT)
Dept: MAMMOGRAPHY | Facility: HOSPITAL | Age: 66
Discharge: HOME | End: 2021-06-04
Admitting: OBSTETRICS & GYNECOLOGY

## 2021-06-04 DIAGNOSIS — Z12.39 SCREENING BREAST EXAMINATION: ICD-10-CM

## 2021-06-04 PROCEDURE — 77063 BREAST TOMOSYNTHESIS BI: CPT

## 2021-06-04 PROCEDURE — 77067 SCR MAMMO BI INCL CAD: CPT

## 2021-09-21 RX ORDER — MIRABEGRON 25 MG/1
TABLET, FILM COATED, EXTENDED RELEASE ORAL
Qty: 30 TABLET | Refills: 11 | Status: SHIPPED | OUTPATIENT
Start: 2021-09-21 | End: 2021-11-18 | Stop reason: SDUPTHER

## 2021-09-28 ENCOUNTER — HOSPITAL ENCOUNTER (EMERGENCY)
Facility: HOSPITAL | Age: 66
Discharge: HOME | End: 2021-09-28
Attending: EMERGENCY MEDICINE | Admitting: EMERGENCY MEDICINE

## 2021-09-28 VITALS
WEIGHT: 191.06 LBS | BODY MASS INDEX: 28.3 KG/M2 | RESPIRATION RATE: 18 BRPM | HEART RATE: 88 BPM | HEIGHT: 69 IN | OXYGEN SATURATION: 99 % | SYSTOLIC BLOOD PRESSURE: 144 MMHG | TEMPERATURE: 98.5 F | DIASTOLIC BLOOD PRESSURE: 81 MMHG

## 2021-09-28 DIAGNOSIS — R04.0 EPISTAXIS: Primary | ICD-10-CM

## 2021-09-28 PROCEDURE — 99282 EMERGENCY DEPT VISIT SF MDM: CPT | Performed by: EMERGENCY MEDICINE

## 2021-09-28 PROCEDURE — 99283 EMERGENCY DEPT VISIT LOW MDM: CPT

## 2021-09-28 RX ORDER — CLONIDINE HYDROCHLORIDE 0.1 MG/1
0.1 TABLET ORAL ONCE
Status: COMPLETED | OUTPATIENT
Start: 2021-09-28 | End: 2021-09-28

## 2021-09-28 RX ORDER — OXYMETAZOLINE HYDROCHLORIDE 0.05 G/100ML
1 SPRAY NASAL ONCE
Status: COMPLETED | OUTPATIENT
Start: 2021-09-28 | End: 2021-09-28

## 2021-09-28 RX ADMIN — CLONIDINE HYDROCHLORIDE 0.1 MG: 0.1 TABLET ORAL at 10:32

## 2021-09-28 RX ADMIN — SILVER NITRATE APPLICATORS 1 APPLICATION: 25; 75 STICK TOPICAL at 10:00

## 2021-09-28 RX ADMIN — Medication 1 SPRAY: at 11:10

## 2021-11-08 RX ORDER — OMEPRAZOLE 40 MG/1
CAPSULE, DELAYED RELEASE ORAL
Qty: 90 CAPSULE | Refills: 3 | Status: SHIPPED | OUTPATIENT
Start: 2021-11-08 | End: 2022-10-31

## 2021-11-16 ENCOUNTER — TELEPHONE (OUTPATIENT)
Dept: GASTROENTEROLOGY | Facility: CLINIC | Age: 66
End: 2021-11-16

## 2021-11-18 ENCOUNTER — OFFICE VISIT (OUTPATIENT)
Dept: OBSTETRICS AND GYNECOLOGY | Facility: CLINIC | Age: 66
End: 2021-11-18

## 2021-11-18 VITALS
WEIGHT: 191 LBS | BODY MASS INDEX: 28.29 KG/M2 | SYSTOLIC BLOOD PRESSURE: 140 MMHG | HEIGHT: 69 IN | DIASTOLIC BLOOD PRESSURE: 82 MMHG

## 2021-11-18 DIAGNOSIS — N32.81 OAB (OVERACTIVE BLADDER): ICD-10-CM

## 2021-11-18 DIAGNOSIS — Z01.419 ROUTINE GYNECOLOGICAL EXAMINATION: Primary | ICD-10-CM

## 2021-11-18 PROCEDURE — G0101 CA SCREEN;PELVIC/BREAST EXAM: HCPCS | Performed by: OBSTETRICS & GYNECOLOGY

## 2021-11-18 RX ORDER — ROSUVASTATIN CALCIUM 5 MG/1
TABLET, COATED ORAL
COMMUNITY
Start: 2021-10-18

## 2021-11-18 NOTE — PROGRESS NOTES
GYN Annual Exam     CC- Here for annual exam.     Gracie Topete is a 66 y.o. female who presents for annual well woman exam. Pt has had a TVH with ovarian conservation. Pt has a TVT approx 20 years ago.  Pt is doing well with her Myrbetriq. Pt is using estrogen cream per vagina and desires to continue. She is not having much intercourse-  is impotent. Pt had precancerous polyps in colonoscopy in 2018. She has sent in paper work to get colonoscopy.     OB History        2    Para   2    Term   2            AB        Living           SAB        IAB        Ectopic        Molar        Multiple        Live Births                    Current contraception: post menopausal status  History of abnormal Pap smear: no  History of abnormal mammogram: no  Family history of uterine, colon or ovarian cancer: no  Family history of breast cancer: no      Health Maintenance   Topic Date Due   • COVID-19 Vaccine (1) Never done   • TDAP/TD VACCINES (1 - Tdap) Never done   • ZOSTER VACCINE (1 of 2) Never done   • HEPATITIS C SCREENING  Never done   • ANNUAL WELLNESS VISIT  Never done   • LIPID PANEL  2018   • Pneumococcal Vaccine 65+ (1 of 1 - PPSV23) Never done   • INFLUENZA VACCINE  Never done   • DXA SCAN  2022   • MAMMOGRAM  2023   • PAP SMEAR  08/10/2023   • COLORECTAL CANCER SCREENING  10/23/2023       Past Medical History:   Diagnosis Date   • Murphy esophagus    • Basal cell carcinoma of chest     NECK, LEGS   • GERD (gastroesophageal reflux disease)    • Hyperlipidemia    • Hypertension        Past Surgical History:   Procedure Laterality Date   • COLONOSCOPY     • COLONOSCOPY N/A 10/23/2018    Procedure: COLONOSCOPY INTO CECUM AND TERMINAL ILEUM WITH COLD BIOPSY POLYPECTOMIES;  Surgeon: Med Wallace MD;  Location: [facility] ENDOSCOPY;  Service: Gastroenterology   • ENDOSCOPY N/A 10/23/2018    Procedure: ESOPHAGOGASTRODUODENOSCOPY WITH COLD BIOPSIES;  Surgeon: Rodrigo  "Med Ly MD;  Location: [facility] ENDOSCOPY;  Service: Gastroenterology   • EYE SURGERY     • HYSTERECTOMY     • ROTATOR CUFF REPAIR     • SKIN TAG REMOVAL     • TUBAL ABDOMINAL LIGATION           Current Outpatient Medications:   •  Cetirizine HCl (KLS ALLER-HUDSON PO), Take 1 tablet by mouth Daily., Disp: , Rfl:   •  estradiol (ESTRACE) 0.1 MG/GM vaginal cream, Insert 1 g into the vagina 2 (Two) Times a Week., Disp: 45 g, Rfl: 6  •  Mirabegron ER (Myrbetriq) 25 MG tablet sustained-release 24 hour 24 hr tablet, Take 1 tablet by mouth Daily., Disp: 30 tablet, Rfl: 11  •  mupirocin (BACTROBAN) 2 % ointment, , Disp: , Rfl:   •  olmesartan-hydrochlorothiazide (BENICAR HCT) 40-12.5 MG per tablet, , Disp: , Rfl:   •  omeprazole (priLOSEC) 40 MG capsule, TAKE ONE CAPSULE BY MOUTH DAILY, Disp: 90 capsule, Rfl: 3  •  rosuvastatin (CRESTOR) 5 MG tablet, , Disp: , Rfl:     No Known Allergies    Social History     Tobacco Use   • Smoking status: Former Smoker   • Smokeless tobacco: Never Used   Substance Use Topics   • Alcohol use: No   • Drug use: No       Family History   Problem Relation Age of Onset   • Colon polyps Mother    • Colon polyps Father    • Malig Hyperthermia Neg Hx    • Breast cancer Neg Hx        Review of Systems   Constitutional: Negative for appetite change, chills, fatigue, fever and unexpected weight change.   Gastrointestinal: Negative for abdominal distention, abdominal pain, anal bleeding, blood in stool, constipation, diarrhea, nausea and vomiting.   Genitourinary: Negative for dyspareunia, dysuria, menstrual problem, pelvic pain, vaginal bleeding, vaginal discharge and vaginal pain.       /82   Ht 175 cm (68.9\")   Wt 86.6 kg (191 lb)   LMP  (LMP Unknown)   BMI 28.29 kg/m²     Physical Exam  Vitals reviewed.   Constitutional:       General: She is not in acute distress.     Appearance: Normal appearance. She is well-developed. She is not ill-appearing, toxic-appearing or diaphoretic. "   HENT:      Mouth/Throat:      Dentition: Normal dentition. No dental caries.   Cardiovascular:      Rate and Rhythm: Normal rate and regular rhythm.      Heart sounds: Normal heart sounds.   Pulmonary:      Effort: Pulmonary effort is normal. No respiratory distress.      Breath sounds: Normal breath sounds. No stridor. No wheezing.   Chest:   Breasts:      Right: No inverted nipple, mass, nipple discharge, skin change or tenderness.      Left: No inverted nipple, mass, nipple discharge, skin change or tenderness.       Abdominal:      General: There is no distension.      Palpations: Abdomen is soft. There is no mass.      Tenderness: There is no abdominal tenderness.   Genitourinary:     General: Normal vulva.      Labia:         Right: No rash, tenderness or lesion.         Left: No rash, tenderness or lesion.       Urethra: No prolapse, urethral pain, urethral swelling or urethral lesion.      Vagina: No foreign body. No vaginal discharge, tenderness or bleeding.      Uterus: Absent.       Adnexa:         Right: No mass, tenderness or fullness.          Left: No mass, tenderness or fullness.        Rectum: No external hemorrhoid.      Comments: No evidence of mesh erosion  Musculoskeletal:         General: No tenderness. Normal range of motion.   Skin:     General: Skin is warm.      Findings: No erythema or rash.   Neurological:      General: No focal deficit present.      Mental Status: She is alert and oriented to person, place, and time. Mental status is at baseline.      Cranial Nerves: No cranial nerve deficit.      Coordination: Coordination normal.   Psychiatric:         Mood and Affect: Mood normal.         Behavior: Behavior normal.         Thought Content: Thought content normal.         Judgment: Judgment normal.            Assessment/Plan    1) GYN HM: LPS 8/2020 neg.  MMG done 6/2021 neg. patient had her last colonoscopy in 2018 where she was diagnosed with polyps.  She is due for another  colonoscopy now.  Patient is aware and is sending her paperwork to have this done.  2) history of TVT: Continue estrogen cream per vagina.  3) Bone health - Weight bearing exercise, dietary calcium recommendations and vitamin D reviewed. Last bone density 5/2020 normal  4) Diet and Exercise discussed  5) Smoking Status: Non-smoker  6) : Patient is not on any HRT.  She has a history of hysterectomy.  She is not having much intercourse because of her 's impotency.  7) overactive bladder: Patient reports improved symptoms on Myrbetriq 25 mg daily.  Refills given.  8) Follow up prn and 1 year       Diagnoses and all orders for this visit:    Routine gynecological examination  -     POC Urinalysis Dipstick    OAB (overactive bladder)    Other orders  -     mupirocin (BACTROBAN) 2 % ointment  -     rosuvastatin (CRESTOR) 5 MG tablet  -     Mirabegron ER (Myrbetriq) 25 MG tablet sustained-release 24 hour 24 hr tablet; Take 1 tablet by mouth Daily.        Jannet Nesbitt DO  11/20/2021  13:06 EST

## 2021-12-29 NOTE — TELEPHONE ENCOUNTER
Spoke with patient.  Her PCP, Dr. Valdez, told her she has fast growing polyps and needs to be every 3 years.  Will trust Dr. Wallace.  Will wait until 10/2023.

## 2022-06-27 ENCOUNTER — OFFICE VISIT (OUTPATIENT)
Dept: OBSTETRICS AND GYNECOLOGY | Facility: CLINIC | Age: 67
End: 2022-06-27

## 2022-06-27 VITALS
WEIGHT: 190.6 LBS | HEIGHT: 64 IN | DIASTOLIC BLOOD PRESSURE: 80 MMHG | SYSTOLIC BLOOD PRESSURE: 144 MMHG | BODY MASS INDEX: 32.54 KG/M2

## 2022-06-27 DIAGNOSIS — Z12.31 ENCOUNTER FOR SCREENING MAMMOGRAM FOR MALIGNANT NEOPLASM OF BREAST: ICD-10-CM

## 2022-06-27 DIAGNOSIS — Z13.9 SCREENING FOR CONDITION: Primary | ICD-10-CM

## 2022-06-27 PROCEDURE — 99212 OFFICE O/P EST SF 10 MIN: CPT | Performed by: NURSE PRACTITIONER

## 2022-06-27 NOTE — PROGRESS NOTES
"Subjective     Chief Complaint   Patient presents with   • Breast Problem     Bump on left breast, doesn't hurt or itch       Gracie Topete is a 66 y.o.  whose LMP is No LMP recorded (lmp unknown). Patient has had a hysterectomy. She presents with today with c/o a breast lump. She reports she felt the lump approx 2 weeks ago while  \"washing up.\" She feels like the area has improved. She first thought it was \"another nipple\" but now that raised area has resolved and the area only looks bruised. She is thinking she had a bug bite but is uncertain. She reports she is due her screening MMG at any time now.     HPI    HPI    The following portions of the patient's history were reviewed and updated as appropriate:vital signs, allergies, current medications, past medical history, past social history, past surgical history and problem list      Review of Systems     Review of Systems   Constitutional: Negative.    Genitourinary: Negative for breast discharge, breast lump and breast pain.        Skin lesion on (L) breast        Objective      /80   Ht 162.6 cm (64\")   Wt 86.5 kg (190 lb 9.6 oz)   LMP  (LMP Unknown)   Breastfeeding No   BMI 32.72 kg/m²     Physical Exam    Physical Exam  Vitals and nursing note reviewed.   Constitutional:       Appearance: Normal appearance.   Chest:   Breasts:      Right: No swelling, bleeding, inverted nipple, mass or skin change.      Left: Skin change present. No swelling, bleeding, inverted nipple, mass or nipple discharge.         Musculoskeletal:         General: Normal range of motion.   Skin:     General: Skin is warm and dry.   Neurological:      General: No focal deficit present.      Mental Status: She is alert and oriented to person, place, and time.   Psychiatric:         Mood and Affect: Mood normal.         Behavior: Behavior normal.         Lab Review   Labs: No data reviewed     Imaging   MMG     Assessment  There are no diagnoses linked to this " encounter.    Additional Assessment:   1. Skin lesion  2. Screening for breast cancer    Plan     1. Skin lesion- The area is superficial and does not appear to be a breast lump. The lesion is healing. Will ref to dermatology if not improvement in the next week or so.   2. Screening for breast cancer- Screening MMG ordered.     RTO DIRK Townsend, APRN  6/27/2022

## 2022-06-30 ENCOUNTER — HOSPITAL ENCOUNTER (OUTPATIENT)
Dept: MAMMOGRAPHY | Facility: HOSPITAL | Age: 67
Discharge: HOME | End: 2022-06-30
Admitting: NURSE PRACTITIONER

## 2022-06-30 DIAGNOSIS — Z13.9 SCREENING FOR CONDITION: ICD-10-CM

## 2022-06-30 DIAGNOSIS — Z12.31 ENCOUNTER FOR SCREENING MAMMOGRAM FOR MALIGNANT NEOPLASM OF BREAST: ICD-10-CM

## 2022-06-30 PROCEDURE — 77063 BREAST TOMOSYNTHESIS BI: CPT

## 2022-06-30 PROCEDURE — 77067 SCR MAMMO BI INCL CAD: CPT

## 2022-10-31 RX ORDER — OMEPRAZOLE 40 MG/1
CAPSULE, DELAYED RELEASE ORAL
Qty: 90 CAPSULE | Refills: 0 | Status: SHIPPED | OUTPATIENT
Start: 2022-10-31 | End: 2023-01-26

## 2022-10-31 RX ORDER — OMEPRAZOLE 40 MG/1
CAPSULE, DELAYED RELEASE ORAL
Qty: 90 CAPSULE | Refills: 3 | OUTPATIENT
Start: 2022-10-31

## 2022-11-07 ENCOUNTER — OFFICE VISIT (OUTPATIENT)
Dept: GASTROENTEROLOGY | Facility: CLINIC | Age: 67
End: 2022-11-07

## 2022-11-07 VITALS
WEIGHT: 195.6 LBS | BODY MASS INDEX: 33.39 KG/M2 | SYSTOLIC BLOOD PRESSURE: 126 MMHG | DIASTOLIC BLOOD PRESSURE: 82 MMHG | HEIGHT: 64 IN

## 2022-11-07 DIAGNOSIS — K59.00 CONSTIPATION, UNSPECIFIED CONSTIPATION TYPE: ICD-10-CM

## 2022-11-07 DIAGNOSIS — Z86.010 PERSONAL HISTORY OF COLONIC POLYPS: ICD-10-CM

## 2022-11-07 DIAGNOSIS — K22.70 BARRETT'S ESOPHAGUS WITHOUT DYSPLASIA: Primary | ICD-10-CM

## 2022-11-07 PROBLEM — Z86.0100 PERSONAL HISTORY OF COLONIC POLYPS: Status: ACTIVE | Noted: 2022-11-07

## 2022-11-07 PROBLEM — K21.9 GASTROESOPHAGEAL REFLUX DISEASE WITHOUT ESOPHAGITIS: Status: ACTIVE | Noted: 2022-11-07

## 2022-11-07 PROCEDURE — 99214 OFFICE O/P EST MOD 30 MIN: CPT

## 2022-11-07 NOTE — PROGRESS NOTES
PATIENT INFORMATION  Gracie Topete       - 1955    CHIEF COMPLAINT  Chief Complaint   Patient presents with   • Follow-up     Barretts Esophagus; annual visit       HISTORY OF PRESENT ILLNESS  Here today for GERD follow-up    Managed well on daily omeprazole, no dysphagia, odynophagia, nausea, vomiting, reflux. Rare breatkthrough heartburn, unless eating too much and not severe. Rare ibuprofen and no OTC meds.    10/23/18 last EGD and Colon, with reflux and 2 adenomas, negative for Barretts which was seen in . Will repeat both in .    Hemorrhoids cause pain sometimes, OTC suppositories and cream resolves pain. Daily BM, no rectal bleeding. Does not always feel empty, 2-3 times a day a little. Frequent bloating. No fiber, not drinking enough water.      REVIEWED PERTINENT RESULTS/ LABS  Lab Results   Component Value Date    CASEREPORT  10/23/2018     Surgical Pathology Report                         Case: [id number]                                  Authorizing Provider:  Med Wallace        Collected:           10/23/2018 08:18 AM                                 MD Aliyah                                                                   Ordering Location:     [facility]  Received:            10/23/2018 10:54 AM                                 ENDO SUITES                                                                  Pathologist:           Tavo Hameed MD                                                                           Specimens:   1) - Esophagus, Distal, DISTAL ESOPHAGEAL BIOPSIES                                                  2) - Large Intestine, Right / Ascending Colon, ASCENDING COLON POLYP X2                             3) - Large Intestine, Sigmoid Colon, SIGMOID COLON POLYP X 2                                        4) - Large Intestine, Rectum, RECTAL POLYP                                                  FINALDX  10/23/2018     1. DISTAL ESOPHAGEAL:   SQUAMOUS AND GLANDULAR (GASTRIC TYPE) MUCOSA WITH MILD CHRONIC INFLAMMATION.   NEGATIVE FOR INTESTINAL METAPLASIA, NEGATIVE FOR DYSPLASIA.    2. ASCENDING COLON POLYP X2:   TUBULAR ADENOMA WITH LOW-GRADE DYSPLASIA.   HYPERPLASTIC POLYP.    3. SIGMOID COLON POLYP X2:   FRAGMENTS OF HYPERPLASTIC POLYP.    4. RECTAL POLYP:   TUBULAR ADENOMA WITH LOW-GRADE DYSPLASIA.    CMK/xiomara  I[facility]/EZEQUIEL (1)    CPT CODES:  1. 86611, 3126F  2. 13706  3. 16059  4. 93622         Lab Results   Component Value Date    HGB 12.3 06/03/2014    MCV 81.3 06/03/2014     06/03/2014    ALT 11 06/03/2014    AST 22 06/03/2014    INR 0.9 06/03/2014    TRIG 287 (H) 06/04/2014      No results found.    REVIEW OF SYSTEMS  Review of Systems   Constitutional: Negative.    HENT: Negative.    Eyes: Negative.    Respiratory: Negative.    Cardiovascular: Negative.    Gastrointestinal: Positive for constipation.   Endocrine: Negative.    Genitourinary: Negative.    Musculoskeletal: Positive for arthralgias.   Skin: Negative.    Allergic/Immunologic: Negative.    Neurological: Negative.    Hematological: Bruises/bleeds easily.   Psychiatric/Behavioral: Negative.          ACTIVE PROBLEMS  Patient Active Problem List    Diagnosis    • Personal history of colonic polyps [Z86.010]    • Gastroesophageal reflux disease without esophagitis [K21.9]    • Constipation [K59.00]    • Encounter for screening mammogram for malignant neoplasm of breast  [Z12.31]    • Screening for colon cancer [Z12.11]    • Murphy's esophagus without dysplasia [K22.70]    • Urinary incontinence [R32]          PAST MEDICAL HISTORY  Past Medical History:   Diagnosis Date   • Murphy esophagus    • Basal cell carcinoma of chest     NECK, LEGS   • GERD (gastroesophageal reflux disease)    • Hyperlipidemia    • Hypertension          SURGICAL HISTORY  Past Surgical History:   Procedure Laterality Date   •  "COLONOSCOPY  2008   • COLONOSCOPY N/A 10/23/2018    Procedure: COLONOSCOPY INTO CECUM AND TERMINAL ILEUM WITH COLD BIOPSY POLYPECTOMIES;  Surgeon: Med Wallace MD;  Location: [facility] ENDOSCOPY;  Service: Gastroenterology   • ENDOSCOPY N/A 10/23/2018    Procedure: ESOPHAGOGASTRODUODENOSCOPY WITH COLD BIOPSIES;  Surgeon: Med Wallace MD;  Location: [facility] ENDOSCOPY;  Service: Gastroenterology   • EYE SURGERY     • HYSTERECTOMY     • ROTATOR CUFF REPAIR     • SKIN TAG REMOVAL     • TUBAL ABDOMINAL LIGATION           FAMILY HISTORY  Family History   Problem Relation Age of Onset   • Colon polyps Mother    • Colon polyps Father    • Malig Hyperthermia Neg Hx    • Breast cancer Neg Hx          SOCIAL HISTORY  Social History     Occupational History   • Not on file   Tobacco Use   • Smoking status: Former   • Smokeless tobacco: Never   Substance and Sexual Activity   • Alcohol use: No   • Drug use: No   • Sexual activity: Yes     Partners: Male         CURRENT MEDICATIONS    Current Outpatient Medications:   •  Cetirizine HCl (KLS ALLER-HUDSON PO), Take 1 tablet by mouth Daily., Disp: , Rfl:   •  estradiol (ESTRACE) 0.1 MG/GM vaginal cream, Insert 1 g into the vagina 2 (Two) Times a Week., Disp: 45 g, Rfl: 6  •  Mirabegron ER (Myrbetriq) 25 MG tablet sustained-release 24 hour 24 hr tablet, Take 1 tablet by mouth Daily., Disp: 30 tablet, Rfl: 11  •  olmesartan-hydrochlorothiazide (BENICAR HCT) 40-12.5 MG per tablet, , Disp: , Rfl:   •  omeprazole (priLOSEC) 40 MG capsule, TAKE ONE CAPSULE BY MOUTH DAILY, Disp: 90 capsule, Rfl: 0  •  rosuvastatin (CRESTOR) 5 MG tablet, , Disp: , Rfl:     ALLERGIES  Patient has no known allergies.    VITALS  Vitals:    11/07/22 1322   BP: 126/82   BP Location: Right arm   Patient Position: Sitting   Cuff Size: Adult   Weight: 88.7 kg (195 lb 9.6 oz)   Height: 162.6 cm (64.02\")       PHYSICAL EXAM  Debilities/Disabilities Identified: None  Emotional Behavior: " "Appropriate  Wt Readings from Last 3 Encounters:   11/07/22 88.7 kg (195 lb 9.6 oz)   06/27/22 86.5 kg (190 lb 9.6 oz)   11/18/21 86.6 kg (191 lb)     Ht Readings from Last 1 Encounters:   11/07/22 162.6 cm (64.02\")     Body mass index is 33.56 kg/m².  Physical Exam  Constitutional:       General: She is not in acute distress.     Appearance: Normal appearance. She is not ill-appearing.   HENT:      Head: Normocephalic and atraumatic.      Mouth/Throat:      Mouth: Mucous membranes are moist.      Pharynx: No posterior oropharyngeal erythema.   Eyes:      General: No scleral icterus.  Cardiovascular:      Rate and Rhythm: Normal rate and regular rhythm.      Pulses: Normal pulses.      Heart sounds: Normal heart sounds.   Pulmonary:      Effort: Pulmonary effort is normal.      Breath sounds: Normal breath sounds.   Abdominal:      General: Abdomen is flat. Bowel sounds are normal. There is no distension.      Palpations: Abdomen is soft. There is no mass.      Tenderness: There is no abdominal tenderness. There is no guarding or rebound. Negative signs include Cruz's sign.      Hernia: No hernia is present.   Musculoskeletal:      Cervical back: Neck supple.   Skin:     General: Skin is warm.      Capillary Refill: Capillary refill takes less than 2 seconds.   Neurological:      General: No focal deficit present.      Mental Status: She is alert and oriented to person, place, and time.   Psychiatric:         Mood and Affect: Mood normal.         Behavior: Behavior normal.         Thought Content: Thought content normal.         Judgment: Judgment normal.         CLINICAL DATA REVIEWED   reviewed previous lab results and integrated with today's visit, reviewed notes from other physicians and/or last GI encounter, reviewed previous endoscopy results and available photos, reviewed surgical pathology results from previous biopsies     ASSESSMENT  Diagnoses and all orders for this visit:    Murphy's esophagus without " dysplasia    Personal history of colonic polyps    Constipation, unspecified constipation type          PLAN  Continue daily PPI  10 g fiber, 64 oz water, daily exercise for bowels  Colon and EGD due 10/2023    Return in about 1 year (around 11/7/2023).    I have discussed the above plan with the patient.  They verbalize understanding and are in agreement with the plan.  They have been advised to contact the office for any questions, concerns, or changes related to their health.

## 2023-01-18 NOTE — TELEPHONE ENCOUNTER
Completely out at this time.  Pharmacy told her that they got no response from us.  Didn't see a request from them.  Thanks.

## 2023-01-26 RX ORDER — OMEPRAZOLE 40 MG/1
CAPSULE, DELAYED RELEASE ORAL
Qty: 90 CAPSULE | Refills: 3 | Status: SHIPPED | OUTPATIENT
Start: 2023-01-26

## 2023-03-15 ENCOUNTER — OFFICE VISIT (OUTPATIENT)
Dept: OBSTETRICS AND GYNECOLOGY | Facility: CLINIC | Age: 68
End: 2023-03-15
Payer: MEDICARE

## 2023-03-15 VITALS
SYSTOLIC BLOOD PRESSURE: 136 MMHG | WEIGHT: 189.6 LBS | HEIGHT: 64 IN | BODY MASS INDEX: 32.37 KG/M2 | DIASTOLIC BLOOD PRESSURE: 84 MMHG

## 2023-03-15 DIAGNOSIS — R82.71 BACTERIA IN URINE: ICD-10-CM

## 2023-03-15 DIAGNOSIS — R39.9 UTI SYMPTOMS: Primary | ICD-10-CM

## 2023-03-15 RX ORDER — SULFAMETHOXAZOLE AND TRIMETHOPRIM 800; 160 MG/1; MG/1
TABLET ORAL
COMMUNITY
Start: 2023-02-22

## 2023-03-15 RX ORDER — OMEPRAZOLE 40 MG/1
1 CAPSULE, DELAYED RELEASE ORAL DAILY
COMMUNITY
Start: 2023-01-26

## 2023-03-15 NOTE — PROGRESS NOTES
"Subjective     Chief Complaint   Patient presents with   • Follow-up     Estradiol med check   Pt feels like medicine is not working,  Still having pain,        Gracie Topete is a 67 y.o.  whose LMP is No LMP recorded (lmp unknown). Patient has had a hysterectomy.. She presents with several c/o today. She c/o sharp stabbing pain with urination with most episodes of urination. She feels like she is emptying her bladder well but she is uncertain. She reports she was seen a [place] urgent care and was treated for a UTI with Bactrim. Her UA was abnormal but no urine cx was completed. She reports her symptoms improved but did not resolve 100%. She is S/P TVT in  (? Date). She had an appt with Dr. Allen last year and he is suspicious that the mesh from the TVT is causing issues. She does not feel like the estrogen cream.     HPI    HPI    The following portions of the patient's history were reviewed and updated as appropriate:vital signs, allergies, current medications, past medical history, past social history, past surgical history and problem list      Review of Systems     Review of Systems   Constitutional: Negative.    Gastrointestinal: Negative.    Genitourinary: Positive for dysuria, frequency and urgency. Negative for decreased urine volume, difficulty urinating, hematuria and urinary incontinence.       Objective      /84   Ht 162.6 cm (64.02\")   Wt 86 kg (189 lb 9.6 oz)   LMP  (LMP Unknown)   BMI 32.53 kg/m²     Physical Exam    Physical Exam  Vitals and nursing note reviewed.   Constitutional:       Appearance: Normal appearance.   Genitourinary:     Labia:         Right: No rash, tenderness, lesion or injury.         Left: No rash, tenderness, lesion or injury.       Vagina: No signs of injury and foreign body. No vaginal discharge or erythema.   Musculoskeletal:         General: Normal range of motion.   Skin:     General: Skin is warm and dry.   Neurological:      General: No focal " deficit present.      Mental Status: She is alert and oriented to person, place, and time.   Psychiatric:         Mood and Affect: Mood normal.         Behavior: Behavior normal.         Lab Review   Labs: Urinalysis - with micro     Imaging   No data reviewed    Assessment  Diagnoses and all orders for this visit:    1. Vaginal odor (Primary)  -     POC Urinalysis Dipstick        Additional Assessment:   1. UTI symptoms    Plan     1. UTI symtpoms- Check Urine cx and NuSwab- mycoplasma. Will treat based on swab results. Enc pt to continue vaginal estrogen. Enc pt to get appt with Dr. Allen.     RTO PRN     Donna Townsend, APRN  3/15/2023

## 2023-03-17 LAB
BACTERIA UR CULT: NORMAL
BACTERIA UR CULT: NORMAL

## 2023-03-21 LAB
M GENITALIUM DNA SPEC QL NAA+PROBE: NEGATIVE
M HOMINIS DNA SPEC QL NAA+PROBE: NEGATIVE
UREAPLASMA DNA SPEC QL NAA+PROBE: NEGATIVE

## 2023-08-15 ENCOUNTER — TRANSCRIBE ORDERS (OUTPATIENT)
Dept: ADMINISTRATIVE | Facility: HOSPITAL | Age: 68
End: 2023-08-15
Payer: MEDICARE

## 2023-08-15 DIAGNOSIS — Z12.31 VISIT FOR SCREENING MAMMOGRAM: Primary | ICD-10-CM

## 2023-08-25 ENCOUNTER — TELEPHONE (OUTPATIENT)
Dept: GASTROENTEROLOGY | Facility: CLINIC | Age: 68
End: 2023-08-25
Payer: MEDICARE

## 2023-08-25 DIAGNOSIS — Z86.010 PERSONAL HISTORY OF COLONIC POLYPS: Primary | ICD-10-CM

## 2023-08-25 DIAGNOSIS — K21.00 GASTROESOPHAGEAL REFLUX DISEASE WITH ESOPHAGITIS, UNSPECIFIED WHETHER HEMORRHAGE: ICD-10-CM

## 2023-08-25 NOTE — TELEPHONE ENCOUNTER
FAST TRACK 5 YEAR RECALL 10/2023  LAST EGD & COLONOSCOPY 10/23/2018  PERSONAL HISTORY POLYPS  HISTORY DIAZ'S  SCHEDULE AT [place].

## 2023-08-29 NOTE — TELEPHONE ENCOUNTER
Spoke with patient.  Schedule at [facility] 01/12/2024 at 12:30pm - arrive 11:30am.  Will mail instructions 2 months prior per patient request.

## 2023-08-30 PROBLEM — K21.00 GASTROESOPHAGEAL REFLUX DISEASE WITH ESOPHAGITIS: Status: ACTIVE | Noted: 2023-08-30

## 2023-09-01 ENCOUNTER — HOSPITAL ENCOUNTER (OUTPATIENT)
Dept: MAMMOGRAPHY | Facility: HOSPITAL | Age: 68
Discharge: HOME | End: 2023-09-01
Payer: MEDICARE

## 2023-09-28 ENCOUNTER — HOSPITAL ENCOUNTER (OUTPATIENT)
Dept: MAMMOGRAPHY | Facility: HOSPITAL | Age: 68
Discharge: HOME | End: 2023-09-28
Admitting: NURSE PRACTITIONER
Payer: MEDICARE

## 2023-09-28 DIAGNOSIS — Z12.31 VISIT FOR SCREENING MAMMOGRAM: ICD-10-CM

## 2023-09-28 PROCEDURE — 77067 SCR MAMMO BI INCL CAD: CPT

## 2023-09-28 PROCEDURE — 77063 BREAST TOMOSYNTHESIS BI: CPT

## 2023-10-10 ENCOUNTER — TELEPHONE (OUTPATIENT)
Dept: GASTROENTEROLOGY | Facility: CLINIC | Age: 68
End: 2023-10-10
Payer: MEDICARE

## 2023-10-10 NOTE — TELEPHONE ENCOUNTER
Called and left message on her voicemail for call back.    Scheduled for EGD & Colonoscopy on 01/12/2024.  Dr. Wallace will be out of town.  Need to reschedule.

## 2023-11-07 ENCOUNTER — OFFICE VISIT (OUTPATIENT)
Dept: GASTROENTEROLOGY | Facility: CLINIC | Age: 68
End: 2023-11-07
Payer: MEDICARE

## 2023-11-07 VITALS
SYSTOLIC BLOOD PRESSURE: 126 MMHG | HEIGHT: 64 IN | WEIGHT: 198.4 LBS | DIASTOLIC BLOOD PRESSURE: 88 MMHG | BODY MASS INDEX: 33.87 KG/M2

## 2023-11-07 DIAGNOSIS — K59.00 CONSTIPATION, UNSPECIFIED CONSTIPATION TYPE: ICD-10-CM

## 2023-11-07 DIAGNOSIS — K21.00 GASTROESOPHAGEAL REFLUX DISEASE WITH ESOPHAGITIS WITHOUT HEMORRHAGE: ICD-10-CM

## 2023-11-07 DIAGNOSIS — K64.9 HEMORRHOIDS, UNSPECIFIED HEMORRHOID TYPE: Primary | ICD-10-CM

## 2023-11-07 RX ORDER — OMEPRAZOLE 40 MG/1
40 CAPSULE, DELAYED RELEASE ORAL DAILY
Qty: 90 CAPSULE | Refills: 3 | Status: SHIPPED | OUTPATIENT
Start: 2023-11-07

## 2023-11-07 RX ORDER — HYDROCORTISONE 25 MG/G
CREAM TOPICAL 2 TIMES DAILY PRN
Qty: 30 G | Refills: 1 | Status: SHIPPED | OUTPATIENT
Start: 2023-11-07

## 2023-11-07 NOTE — PROGRESS NOTES
PATIENT INFORMATION  Gracie Topete       - 1955    CHIEF COMPLAINT  Chief Complaint   Patient presents with    Murphy's Esophagus     Hemorrhoids       HISTORY OF PRESENT ILLNESS    Here today for GERD and CIC follow-up    GERD: Managed well on daily omeprazole, no dysphagia, odynophagia, nausea, vomiting, reflux. Rare breatkthrough heartburn, unless eating too much and not severe. Rare ibuprofen and no OTC meds. TODAY: Doing well unless over overeats, so avoids that. Happens when waits too late to eat and then eats too much. OK to use PRN AA.    CIC: Hemorrhoids cause pain sometimes, OTC suppositories and cream resolves pain. Daily BM, no rectal bleeding. Does not always feel empty, 2-3 times a day a little. Frequent bloating. No fiber, not drinking enough water. Reviewed adding daily fiber. TODAY:  Did not start fiber, hemorrhoid flares once a week. Flares when has day with incomplete BM. No bleeding. OTC creams not working. Bloating, gas not an issue.    10/23/18 last EGD and Colon, with reflux and 2 adenomas, negative for Barretts which was seen in . Will repeat both in .    Scheduled for EGD and colon 2024    Hemorrhoids  Pertinent negatives include no abdominal pain, nausea or vomiting.       REVIEWED PERTINENT RESULTS/ LABS  Lab Results   Component Value Date    CASEREPORT  10/23/2018     Surgical Pathology Report                         Case: [id number]                                  Authorizing Provider:  Med Wallace        Collected:           10/23/2018 08:18 AM                                 MD Aliyah                                                                   Ordering Location:     [facility]  Received:            10/23/2018 10:54 AM                                 [facility]                                                                  Pathologist:           Tavo Hameed                                                                            MD                                                                           Specimens:   1) - Esophagus, Distal, DISTAL ESOPHAGEAL BIOPSIES                                                  2) - Large Intestine, Right / Ascending Colon, ASCENDING COLON POLYP X2                             3) - Large Intestine, Sigmoid Colon, SIGMOID COLON POLYP X 2                                        4) - Large Intestine, Rectum, RECTAL POLYP                                                 FINALDX  10/23/2018     1. DISTAL ESOPHAGEAL:   SQUAMOUS AND GLANDULAR (GASTRIC TYPE) MUCOSA WITH MILD CHRONIC INFLAMMATION.   NEGATIVE FOR INTESTINAL METAPLASIA, NEGATIVE FOR DYSPLASIA.    2. ASCENDING COLON POLYP X2:   TUBULAR ADENOMA WITH LOW-GRADE DYSPLASIA.   HYPERPLASTIC POLYP.    3. SIGMOID COLON POLYP X2:   FRAGMENTS OF HYPERPLASTIC POLYP.    4. RECTAL POLYP:   TUBULAR ADENOMA WITH LOW-GRADE DYSPLASIA.    CMK/pkm  IHC/EZEQUIEL (1)    CPT CODES:  1. 58346, 3126F  2. 21654  3. 64762  4. 63685         Lab Results   Component Value Date    HGB 12.3 06/03/2014    MCV 81.3 06/03/2014     06/03/2014    ALT 11 06/03/2014    AST 22 06/03/2014    INR 0.9 06/03/2014    TRIG 287 (H) 06/04/2014      No results found.    REVIEW OF SYSTEMS  Review of Systems   Constitutional: Negative.    HENT: Negative.     Eyes: Negative.    Respiratory: Negative.     Cardiovascular: Negative.    Gastrointestinal:  Positive for hemorrhoids. Negative for abdominal pain, constipation, diarrhea, nausea and vomiting.        Murphy's, Hemorrhoids    Genitourinary:  Positive for flank pain.   Musculoskeletal:  Positive for neck stiffness.   Skin: Negative.    Allergic/Immunologic: Negative.    Neurological: Negative.    Hematological: Negative.    Psychiatric/Behavioral: Negative.           ACTIVE PROBLEMS  Patient Active Problem List    Diagnosis     Gastroesophageal reflux disease with esophagitis [K21.00]     UTI symptoms [R39.9]     Personal  history of colonic polyps [Z86.010]     Gastroesophageal reflux disease without esophagitis [K21.9]     Constipation [K59.00]     Encounter for screening mammogram for malignant neoplasm of breast  [Z12.31]     Screening for colon cancer [Z12.11]     Murphy's esophagus without dysplasia [K22.70]     Urinary incontinence [R32]          PAST MEDICAL HISTORY  Past Medical History:   Diagnosis Date    Murphy esophagus     Basal cell carcinoma of chest     NECK, LEGS    GERD (gastroesophageal reflux disease)     Hyperlipidemia     Hypertension          SURGICAL HISTORY  Past Surgical History:   Procedure Laterality Date    COLONOSCOPY  2008    COLONOSCOPY N/A 10/23/2018    Procedure: COLONOSCOPY INTO CECUM AND TERMINAL ILEUM WITH COLD BIOPSY POLYPECTOMIES;  Surgeon: Med Wallace MD;  Location:  [facility];  Service: Gastroenterology    ENDOSCOPY N/A 10/23/2018    Procedure: ESOPHAGOGASTRODUODENOSCOPY WITH COLD BIOPSIES;  Surgeon: Med Wallace MD;  Location:  [facility];  Service: Gastroenterology    EYE SURGERY      HYSTERECTOMY      ROTATOR CUFF REPAIR      SKIN TAG REMOVAL      TUBAL ABDOMINAL LIGATION           FAMILY HISTORY  Family History   Problem Relation Age of Onset    Colon polyps Mother     Colon polyps Father     Malig Hyperthermia Neg Hx     Breast cancer Neg Hx          SOCIAL HISTORY  Social History     Occupational History    Not on file   Tobacco Use    Smoking status: Former     Passive exposure: Never    Smokeless tobacco: Never   Vaping Use    Vaping Use: Never used   Substance and Sexual Activity    Alcohol use: No    Drug use: No    Sexual activity: Yes     Partners: Male         CURRENT MEDICATIONS    Current Outpatient Medications:     Cetirizine HCl (KLS ALLER-HUDSON PO), Take 1 tablet by mouth Daily., Disp: , Rfl:     estradiol (ESTRACE) 0.1 MG/GM vaginal cream, Insert 1 g into the vagina 2 (Two) Times a Week., Disp: 45 g, Rfl: 6    Mirabegron ER  "(Myrbetriq) 25 MG tablet sustained-release 24 hour 24 hr tablet, Take 1 tablet by mouth Daily., Disp: 30 tablet, Rfl: 11    olmesartan-hydrochlorothiazide (BENICAR HCT) 40-12.5 MG per tablet, , Disp: , Rfl:     omeprazole (priLOSEC) 40 MG capsule, Take 1 capsule by mouth Daily., Disp: 90 capsule, Rfl: 3    rosuvastatin (CRESTOR) 5 MG tablet, , Disp: , Rfl:     Hydrocortisone, Perianal, (ANUSOL-HC) 2.5 % rectal cream, Insert  into the rectum 2 (Two) Times a Day As Needed for Hemorrhoids., Disp: 30 g, Rfl: 1    ALLERGIES  Patient has no known allergies.    VITALS  Vitals:    11/07/23 1000   BP: 126/88   BP Location: Left arm   Patient Position: Sitting   Cuff Size: Large Adult   Weight: 90 kg (198 lb 6.4 oz)   Height: 162.6 cm (64.02\")       PHYSICAL EXAM  Debilities/Disabilities Identified: None  Emotional Behavior: Appropriate  Wt Readings from Last 3 Encounters:   11/07/23 90 kg (198 lb 6.4 oz)   03/15/23 86 kg (189 lb 9.6 oz)   11/07/22 88.7 kg (195 lb 9.6 oz)     Ht Readings from Last 1 Encounters:   11/07/23 162.6 cm (64.02\")     Body mass index is 34.04 kg/m².  Physical Exam  Constitutional:       General: She is not in acute distress.     Appearance: Normal appearance. She is not ill-appearing.   HENT:      Head: Normocephalic and atraumatic.      Mouth/Throat:      Mouth: Mucous membranes are moist.      Pharynx: No posterior oropharyngeal erythema.   Eyes:      General: No scleral icterus.  Cardiovascular:      Rate and Rhythm: Normal rate and regular rhythm.      Heart sounds: Normal heart sounds.   Pulmonary:      Effort: Pulmonary effort is normal.      Breath sounds: Normal breath sounds.   Abdominal:      General: Abdomen is flat. Bowel sounds are normal. There is no distension.      Palpations: Abdomen is soft. There is no mass.      Tenderness: There is no abdominal tenderness in the right lower quadrant. There is no guarding or rebound. Negative signs include Cruz's sign.      Hernia: No hernia is " present.   Musculoskeletal:      Cervical back: Neck supple.   Skin:     General: Skin is warm.      Capillary Refill: Capillary refill takes less than 2 seconds.   Neurological:      General: No focal deficit present.      Mental Status: She is alert and oriented to person, place, and time.   Psychiatric:         Mood and Affect: Mood normal.         Behavior: Behavior normal.         Thought Content: Thought content normal.         Judgment: Judgment normal.         CLINICAL DATA REVIEWED   reviewed previous lab results and integrated with today's visit, reviewed notes from other physicians and/or last GI encounter, reviewed previous endoscopy results and available photos, reviewed surgical pathology results from previous biopsies      ASSESSMENT  Diagnoses and all orders for this visit:    Hemorrhoids, unspecified hemorrhoid type  -     Hydrocortisone, Perianal, (ANUSOL-HC) 2.5 % rectal cream; Insert  into the rectum 2 (Two) Times a Day As Needed for Hemorrhoids.    Gastroesophageal reflux disease with esophagitis without hemorrhage  -     omeprazole (priLOSEC) 40 MG capsule; Take 1 capsule by mouth Daily.    Constipation, unspecified constipation type          PLAN    Continue Daily PPI  Add fiber, increase fluids  Use anusol BID x4-5 days, then PRN    Return in about 1 year (around 11/7/2024).    I have discussed the above plan with the patient.  They verbalize understanding and are in agreement with the plan.  They have been advised to contact the office for any questions, concerns, or changes related to their health.

## 2023-11-16 ENCOUNTER — TELEPHONE (OUTPATIENT)
Dept: GASTROENTEROLOGY | Facility: CLINIC | Age: 68
End: 2023-11-16
Payer: MEDICARE

## 2023-11-16 NOTE — TELEPHONE ENCOUNTER
Scheduled for EGD & Colonoscopy on 01/12/2024, Dr. Wallace will be out of town.    Rescheduled to [place] 01/26/2024 at 1:30pm - arrive 12:30pm.  Prep instructions were give on 11/07/2023.

## 2024-01-24 ENCOUNTER — ANESTHESIA EVENT (OUTPATIENT)
Dept: PERIOP | Facility: HOSPITAL | Age: 69
End: 2024-01-24
Payer: MEDICARE

## 2024-01-25 ENCOUNTER — TELEPHONE (OUTPATIENT)
Dept: GASTROENTEROLOGY | Facility: CLINIC | Age: 69
End: 2024-01-25
Payer: MEDICARE

## 2024-01-25 NOTE — TELEPHONE ENCOUNTER
Spoke with patient.  She states been running fever for the past 24 hours around 100.6.  Explained need to fever free for 24 hours.  It was be best to reschedule.  She has not started her bowel prep.    Rescheduled to [place] 03/28/2024 at 2pm - arrive 1pm.  Will mail new instructions.

## 2024-01-25 NOTE — TELEPHONE ENCOUNTER
Spouse called and left message on my voicemail at 8:47am.  He states wife scheduled for EGD & Colonoscopy tomorrow.  She is running fever has the Norovisrus (getting over it) and UTI.  I think we need to reschedule.  Call my wife at [phone].

## 2024-01-26 ENCOUNTER — ANESTHESIA (OUTPATIENT)
Dept: PERIOP | Facility: HOSPITAL | Age: 69
End: 2024-01-26
Payer: MEDICARE

## 2024-03-04 ENCOUNTER — TELEPHONE (OUTPATIENT)
Dept: OTHER | Facility: OTHER | Age: 69
End: 2024-03-04
Payer: MEDICARE

## 2024-03-04 NOTE — TELEPHONE ENCOUNTER
Caller: Gracie Topete    Relationship to patient: Self    Best call back number: [phone]     Chief complaint: PATIENT NEEDS TO CANCEL SCOPES ON 3/28/24.    Type of visit: UPPER AND LOWER SCOPE    Requested date: LATE IN THE FIRST WEEK OF  APRIL OR AFTER.  PATIENT IS WILLING TO GO TO OTHER LOCATIONS ALSO.     If rescheduling, when is the original appointment: 3/28/24     Additional notes: PLEASE CALL BACK TO RESCHEDULE.            Attempted PICC placement in the left leg but unable to achieve good venipuncture. Attempt for line was unsuccessful. Infant tolerated procedure well.

## 2024-03-12 NOTE — TELEPHONE ENCOUNTER
Spoke with patient.  Scheduled at [place] 04/16/2024 at 2:15pm - arrive 1pm.  Will mail new instructions.

## 2024-04-15 ENCOUNTER — TELEPHONE (OUTPATIENT)
Dept: GASTROENTEROLOGY | Facility: CLINIC | Age: 69
End: 2024-04-15
Payer: MEDICARE

## 2024-04-15 NOTE — TELEPHONE ENCOUNTER
Called stating had not stopped her Celebrex 5 days and her Olmesartan, took it this morning.  Also has been drinking coffee all morning with milk and creamer.    Explained need to rescheduld.    Rescheduled to [place] 05/28/2024 at 1pm - arrive 12pm.  Will send new instructions.

## 2024-05-28 ENCOUNTER — HOSPITAL ENCOUNTER (OUTPATIENT)
Facility: HOSPITAL | Age: 69
Setting detail: HOSPITAL OUTPATIENT SURGERY
Discharge: HOME | End: 2024-05-28
Attending: INTERNAL MEDICINE | Admitting: INTERNAL MEDICINE
Payer: MEDICARE

## 2024-05-28 VITALS
OXYGEN SATURATION: 100 % | HEART RATE: 82 BPM | RESPIRATION RATE: 14 BRPM | SYSTOLIC BLOOD PRESSURE: 162 MMHG | DIASTOLIC BLOOD PRESSURE: 83 MMHG | WEIGHT: 195 LBS | BODY MASS INDEX: 33.29 KG/M2 | HEIGHT: 64 IN | TEMPERATURE: 98.1 F

## 2024-05-28 DIAGNOSIS — K21.00 GASTROESOPHAGEAL REFLUX DISEASE WITH ESOPHAGITIS, UNSPECIFIED WHETHER HEMORRHAGE: ICD-10-CM

## 2024-05-28 DIAGNOSIS — Z86.010 PERSONAL HISTORY OF COLONIC POLYPS: ICD-10-CM

## 2024-05-28 PROCEDURE — 88305 TISSUE EXAM BY PATHOLOGIST: CPT | Performed by: INTERNAL MEDICINE

## 2024-05-28 PROCEDURE — G0105 COLORECTAL SCRN; HI RISK IND: HCPCS | Performed by: INTERNAL MEDICINE

## 2024-05-28 PROCEDURE — 25810000003 LACTATED RINGERS PER 1000 ML: Performed by: NURSE ANESTHETIST, CERTIFIED REGISTERED

## 2024-05-28 PROCEDURE — 25010000002 PROPOFOL 200 MG/20ML EMULSION: Performed by: NURSE ANESTHETIST, CERTIFIED REGISTERED

## 2024-05-28 PROCEDURE — 43239 EGD BIOPSY SINGLE/MULTIPLE: CPT | Performed by: INTERNAL MEDICINE

## 2024-05-28 RX ORDER — SODIUM CHLORIDE 9 MG/ML
40 INJECTION, SOLUTION INTRAVENOUS AS NEEDED
Status: DISCONTINUED | OUTPATIENT
Start: 2024-05-28 | End: 2024-05-28 | Stop reason: HOSPADM

## 2024-05-28 RX ORDER — PROPOFOL 10 MG/ML
INJECTION, EMULSION INTRAVENOUS AS NEEDED
Status: DISCONTINUED | OUTPATIENT
Start: 2024-05-28 | End: 2024-05-28 | Stop reason: SURG

## 2024-05-28 RX ORDER — LIDOCAINE HYDROCHLORIDE 20 MG/ML
INJECTION, SOLUTION INFILTRATION; PERINEURAL AS NEEDED
Status: DISCONTINUED | OUTPATIENT
Start: 2024-05-28 | End: 2024-05-28 | Stop reason: SURG

## 2024-05-28 RX ORDER — SODIUM CHLORIDE, SODIUM LACTATE, POTASSIUM CHLORIDE, CALCIUM CHLORIDE 600; 310; 30; 20 MG/100ML; MG/100ML; MG/100ML; MG/100ML
100 INJECTION, SOLUTION INTRAVENOUS ONCE
Status: DISCONTINUED | OUTPATIENT
Start: 2024-05-28 | End: 2024-05-28 | Stop reason: HOSPADM

## 2024-05-28 RX ORDER — SODIUM CHLORIDE 0.9 % (FLUSH) 0.9 %
10 SYRINGE (ML) INJECTION EVERY 12 HOURS SCHEDULED
Status: DISCONTINUED | OUTPATIENT
Start: 2024-05-28 | End: 2024-05-28 | Stop reason: HOSPADM

## 2024-05-28 RX ORDER — SODIUM CHLORIDE, SODIUM LACTATE, POTASSIUM CHLORIDE, CALCIUM CHLORIDE 600; 310; 30; 20 MG/100ML; MG/100ML; MG/100ML; MG/100ML
9 INJECTION, SOLUTION INTRAVENOUS CONTINUOUS
Status: DISCONTINUED | OUTPATIENT
Start: 2024-05-28 | End: 2024-05-28 | Stop reason: HOSPADM

## 2024-05-28 RX ORDER — LIDOCAINE HYDROCHLORIDE 10 MG/ML
0.5 INJECTION, SOLUTION INFILTRATION; PERINEURAL ONCE AS NEEDED
Status: DISCONTINUED | OUTPATIENT
Start: 2024-05-28 | End: 2024-05-28 | Stop reason: HOSPADM

## 2024-05-28 RX ORDER — ONDANSETRON 2 MG/ML
4 INJECTION INTRAMUSCULAR; INTRAVENOUS ONCE AS NEEDED
Status: DISCONTINUED | OUTPATIENT
Start: 2024-05-28 | End: 2024-05-28 | Stop reason: HOSPADM

## 2024-05-28 RX ORDER — SODIUM CHLORIDE 0.9 % (FLUSH) 0.9 %
10 SYRINGE (ML) INJECTION AS NEEDED
Status: DISCONTINUED | OUTPATIENT
Start: 2024-05-28 | End: 2024-05-28 | Stop reason: HOSPADM

## 2024-05-28 RX ADMIN — PROPOFOL 400 MG: 10 INJECTION, EMULSION INTRAVENOUS at 13:00

## 2024-05-28 RX ADMIN — SODIUM CHLORIDE, POTASSIUM CHLORIDE, SODIUM LACTATE AND CALCIUM CHLORIDE 9 ML/HR: 600; 310; 30; 20 INJECTION, SOLUTION INTRAVENOUS at 12:18

## 2024-05-28 RX ADMIN — LIDOCAINE HYDROCHLORIDE 100 MG: 20 INJECTION, SOLUTION INFILTRATION; PERINEURAL at 13:00

## 2024-05-28 NOTE — ANESTHESIA PREPROCEDURE EVALUATION
Anesthesia Evaluation     Patient summary reviewed and Nursing notes reviewed   no history of anesthetic complications:   NPO Solid Status: > 8 hours  NPO Liquid Status: > 2 hours           Airway   Mallampati: III  TM distance: >3 FB  Neck ROM: full  Possible difficult intubation  Dental - normal exam     Pulmonary - normal exam   (+) a smoker (quit 25 years ago) Former,  Cardiovascular - normal exam  Exercise tolerance: good (4-7 METS)    (+) hypertension well controlled less than 2 medications, hyperlipidemia      Neuro/Psych- negative ROS  GI/Hepatic/Renal/Endo    (+) GERD well controlled    Musculoskeletal     (+) chronic pain (hip)  Abdominal  - normal exam   Substance History - negative use     OB/GYN          Other      history of cancer (skin) remission    ROS/Med Hx Other: Sips of water with meds at 1000              Anesthesia Plan    ASA 3     MAC     intravenous induction     Anesthetic plan, risks, benefits, and alternatives have been provided, discussed and informed consent has been obtained with: patient.    Use of blood products discussed with patient .      CODE STATUS:

## 2024-05-28 NOTE — BRIEF OP NOTE
ESOPHAGOGASTRODUODENOSCOPY WITH BIOPSY, COLONOSCOPY  Progress Note    Gracie Topete  5/28/2024    Pre-op Diagnosis:   Personal history of colonic polyps [Z86.010]  Gastroesophageal reflux disease with esophagitis, unspecified whether hemorrhage [K21.00]       Post-Op Diagnosis Codes:     * Personal history of colonic polyps [Z86.010]     * Gastroesophageal reflux disease with esophagitis, unspecified whether hemorrhage [K21.00]     * Murphy's esophagus [K22.70]     * Diverticulosis [K57.90]     * Angiodysplasia of colon [K55.20]    Procedure/CPT® Codes:        Procedure(s):  ESOPHAGOGASTRODUODENOSCOPY WITH BIOPSY  COLONOSCOPY              Surgeon(s):  Med Wallace MD    Anesthesia: Monitored Anesthesia Care    Staff:   Circulator: Manfred Garcia RN  Scrub Person: Gio Aj         Estimated Blood Loss: none    Urine Voided: * No values recorded between 5/28/2024  1:00 PM and 5/28/2024  1:35 PM *    Specimens:                Specimens       ID Source Type Tests Collected By Collected At Frozen?    A Esophagus, Distal Tissue TISSUE PATHOLOGY EXAM   Med Wallace MD 5/28/24 1310     Description: bx                  Drains: * No LDAs found *    Findings: Normal Duodenum  Normal Stomach(w polyps)  SSBE-Biopsy    Colon to TI good prep  Sigmoid Diverticulosis  Single Cecal Angiodysplasia            Complications: None          Med Wallace MD     Date: 5/28/2024  Time: 13:35 EDT

## 2024-05-28 NOTE — ANESTHESIA POSTPROCEDURE EVALUATION
Patient: Gracie Topete    Procedure Summary       Date: 05/28/24 Room / Location: [facility] ENDOSCOPY 1 /  [facility]    Anesthesia Start: 1300 Anesthesia Stop: 1335    Procedures:       ESOPHAGOGASTRODUODENOSCOPY WITH BIOPSY (Esophagus)      COLONOSCOPY Diagnosis:       Personal history of colonic polyps      Gastroesophageal reflux disease with esophagitis, unspecified whether hemorrhage      Murphy's esophagus      Diverticulosis      Angiodysplasia of colon      (Personal history of colonic polyps [Z86.010])      (Gastroesophageal reflux disease with esophagitis, unspecified whether hemorrhage [K21.00])    Surgeons: Med Wallace MD Provider: Nimesh Escalante CRNA    Anesthesia Type: MAC ASA Status: 3            Anesthesia Type: MAC    Vitals  Vitals Value Taken Time   /93 05/28/24 1400   Temp 98.1 °F (36.7 °C) 05/28/24 1338   Pulse 80 05/28/24 1401   Resp 12 05/28/24 1350   SpO2 98 % 05/28/24 1401   Vitals shown include unfiled device data.        Post Anesthesia Care and Evaluation    Patient location during evaluation: bedside  Patient participation: complete - patient participated  Level of consciousness: awake and alert  Pain score: 0  Pain management: adequate    Airway patency: patent  Anesthetic complications: No anesthetic complications  PONV Status: none  Cardiovascular status: acceptable  Respiratory status: acceptable  Hydration status: acceptable

## 2024-05-28 NOTE — H&P
Patient Care Team:  Johnathon Gardiner MD as PCP - General (Family Medicine)    CHIEF COMPLAINT: Personal hx colon polyps and Barretts Esophagus    HISTORY OF PRESENT ILLNESS:  Last exams were 2018    Past Medical History:   Diagnosis Date    Murphy esophagus     Basal cell carcinoma of chest     NECK, LEGS    GERD (gastroesophageal reflux disease)     Hyperlipidemia     Hypertension      Past Surgical History:   Procedure Laterality Date    CATARACT EXTRACTION      COLONOSCOPY  2008    COLONOSCOPY N/A 10/23/2018    Procedure: COLONOSCOPY INTO CECUM AND TERMINAL ILEUM WITH COLD BIOPSY POLYPECTOMIES;  Surgeon: Med Wallace MD;  Location:  [facility];  Service: Gastroenterology    ENDOSCOPY N/A 10/23/2018    Procedure: ESOPHAGOGASTRODUODENOSCOPY WITH COLD BIOPSIES;  Surgeon: Med Wallace MD;  Location:  [facility];  Service: Gastroenterology    EYE SURGERY      HYSTERECTOMY      ROTATOR CUFF REPAIR      SKIN TAG REMOVAL      TUBAL ABDOMINAL LIGATION       Family History   Problem Relation Age of Onset    Colon polyps Mother     Colon polyps Father     Malig Hyperthermia Neg Hx     Breast cancer Neg Hx      Social History     Tobacco Use    Smoking status: Former     Passive exposure: Never    Smokeless tobacco: Never   Vaping Use    Vaping status: Never Used   Substance Use Topics    Alcohol use: No    Drug use: No     Medications Prior to Admission   Medication Sig Dispense Refill Last Dose    Cetirizine HCl (KLS ALLER-HUDSON PO) Take 1 tablet by mouth Daily.   5/27/2024    estradiol (ESTRACE) 0.1 MG/GM vaginal cream Insert 1 g into the vagina 2 (Two) Times a Week. 45 g 6 5/27/2024    Hydrocortisone, Perianal, (ANUSOL-HC) 2.5 % rectal cream Insert  into the rectum 2 (Two) Times a Day As Needed for Hemorrhoids. 30 g 1 5/27/2024    Mirabegron ER (Myrbetriq) 25 MG tablet sustained-release 24 hour 24 hr tablet Take 1 tablet by mouth Daily. 30 tablet 11 5/27/2024     "olmesartan-hydrochlorothiazide (BENICAR HCT) 40-12.5 MG per tablet    5/28/2024 at 1000    omeprazole (priLOSEC) 40 MG capsule Take 1 capsule by mouth Daily. 90 capsule 3 5/27/2024    rosuvastatin (CRESTOR) 5 MG tablet    5/27/2024     Allergies:  Patient has no known allergies.    REVIEW OF SYSTEMS:  Please see the above history of present illness for pertinent positives and negatives.  The remainder of the patient's systems have been reviewed and are negative.     Vital Signs  Temp:  [97.9 °F (36.6 °C)] 97.9 °F (36.6 °C)  Heart Rate:  [94] 94  Resp:  [20] 20  BP: (163)/(83) 163/83    Flowsheet Rows      Flowsheet Row First Filed Value   Admission Height 162.6 cm (64\") Documented at 05/24/2024 1237   Admission Weight 88.5 kg (195 lb) Documented at 05/24/2024 1237             Physical Exam:  Physical Exam   Constitutional: Patient appears well-developed and well-nourished and in no acute distress   HEENT:   Head: Normocephalic and atraumatic.   Eyes:  Pupils are equal, round, and reactive to light. EOM are intact. Sclerae are anicteric and non-injected.  Mouth and Throat: Patient has moist mucous membranes. Oropharynx is clear of any erythema or exudate.     Neck: Neck supple. No JVD present. No thyromegaly present. No lymphadenopathy present.  Cardiovascular: Regular rate, regular rhythm, S1 normal and S2 normal.  Exam reveals no gallop and no friction rub.  No murmur heard.  Pulmonary/Chest: Lungs are clear to auscultation bilaterally. No respiratory distress. No wheezes. No rhonchi. No rales.   Abdominal: Soft. Bowel sounds are normal. No distension and no mass. There is no hepatosplenomegaly. There is no tenderness.   Musculoskeletal: Normal Muscle tone  Extremities: No edema. Pulses are palpable in all 4 extremities.  Neurological: Patient is alert and oriented to person, place, and time. Cranial nerves II-XII are grossly intact with no focal deficits.  Skin: Skin is warm. No rash noted. Nails show no clubbing. "  No cyanosis or erythema.    Debilities/Disabilities Identified: None  Emotional Behavior: Appropriate     Results Review:   I reviewed the patient's new clinical results.    Lab Results (most recent)       None            Imaging Results (Most Recent)       None          reviewed    ECG/EMG Results (most recent)       None          reviewed    Assessment & Plan   Personal hx colon polyps and Barretts Esophagus/  EGD and colonoscopy      I discussed the patient's findings and my recommendations with patient.     Med Wallace MD  05/28/24  12:57 EDT    Time: 10 min prior to procedure.

## 2024-05-30 LAB
LAB AP CASE REPORT: NORMAL
PATH REPORT.FINAL DX SPEC: NORMAL
PATH REPORT.GROSS SPEC: NORMAL

## 2024-09-04 ENCOUNTER — TRANSCRIBE ORDERS (OUTPATIENT)
Dept: ADMINISTRATIVE | Facility: HOSPITAL | Age: 69
End: 2024-09-04
Payer: MEDICARE

## 2024-09-04 DIAGNOSIS — Z12.31 BREAST CANCER SCREENING BY MAMMOGRAM: Primary | ICD-10-CM

## 2024-09-13 DIAGNOSIS — K64.9 HEMORRHOIDS, UNSPECIFIED HEMORRHOID TYPE: ICD-10-CM

## 2024-09-13 RX ORDER — HYDROCORTISONE 25 MG/G
CREAM TOPICAL 2 TIMES DAILY PRN
Qty: 30 G | Refills: 0 | Status: SHIPPED | OUTPATIENT
Start: 2024-09-13

## 2024-10-04 ENCOUNTER — HOSPITAL ENCOUNTER (OUTPATIENT)
Dept: MAMMOGRAPHY | Facility: HOSPITAL | Age: 69
Discharge: HOME | End: 2024-10-04
Admitting: INTERNAL MEDICINE
Payer: MEDICARE

## 2024-10-04 DIAGNOSIS — Z12.31 BREAST CANCER SCREENING BY MAMMOGRAM: ICD-10-CM

## 2024-10-04 PROCEDURE — 77063 BREAST TOMOSYNTHESIS BI: CPT

## 2024-10-04 PROCEDURE — 77067 SCR MAMMO BI INCL CAD: CPT

## 2024-11-07 ENCOUNTER — OFFICE VISIT (OUTPATIENT)
Dept: GASTROENTEROLOGY | Facility: CLINIC | Age: 69
End: 2024-11-07
Payer: MEDICARE

## 2024-11-07 VITALS
BODY MASS INDEX: 33.97 KG/M2 | SYSTOLIC BLOOD PRESSURE: 120 MMHG | DIASTOLIC BLOOD PRESSURE: 80 MMHG | WEIGHT: 199 LBS | HEIGHT: 64 IN

## 2024-11-07 DIAGNOSIS — K21.00 GASTROESOPHAGEAL REFLUX DISEASE WITH ESOPHAGITIS WITHOUT HEMORRHAGE: ICD-10-CM

## 2024-11-07 DIAGNOSIS — K59.04 CHRONIC IDIOPATHIC CONSTIPATION: ICD-10-CM

## 2024-11-07 DIAGNOSIS — K21.9 GASTROESOPHAGEAL REFLUX DISEASE WITHOUT ESOPHAGITIS: Primary | ICD-10-CM

## 2024-11-07 RX ORDER — OMEPRAZOLE 40 MG/1
40 CAPSULE, DELAYED RELEASE ORAL DAILY
Qty: 90 CAPSULE | Refills: 3 | Status: SHIPPED | OUTPATIENT
Start: 2024-11-07

## 2024-11-07 RX ORDER — CELECOXIB 200 MG/1
1 CAPSULE ORAL DAILY
COMMUNITY

## 2024-11-07 NOTE — PROGRESS NOTES
PATIENT INFORMATION  Gracie Topete       - 1955    CHIEF COMPLAINT  Chief Complaint   Patient presents with    Heartburn    Constipation    Hemorrhoids       HISTORY OF PRESENT ILLNESS  Here today for EGD and Colonoscopy follow-up    2024 EGD for barretts positive for mild reflux esophagitis, no barretts.  2024 Colonoscopy with 0 adenomas, recall in 7 yrs.  Reviewed path and images with patient.    GERD: Managed with 40 mg omeprazole daily. Fine unless over eating, but not severe and good at avoiding. Dysphagia and attributes to not drinking enough water, has to gulp to get dry foods down like sandwich and no pain, water washes down.  gets on her to drink more water.     CIC: Incomplete emptying at LOV with 2-3 BM a day. Hx of hemorrhoids, previously non compliant with daily fiber supplement and fluid recommendations, gave anusol to use at LOV. Today bowels are moving daily and some days difficult, bought fiber at LOV. Reviewed ways to increase fluids intake.         REVIEWED PERTINENT RESULTS/ LABS  Lab Results   Component Value Date    CASEREPORT  2024     Surgical Pathology Report                         Case: [id number]                                  Authorizing Provider:  Med Wallace        Collected:           2024 01:10 PM                                 MD Aliyah                                                                   Ordering Location:     [facility]   Received:            2024 02:20 PM                                 OR                                                                           Pathologist:           Brandon Petty MD                                                          Specimen:    Esophagus, Distal, bx                                                                      FINALDX  2024     1.  Esophagus, distal, biopsy: Squamoglandular mucosa and submucosa with    A.  Mild chronic inflammation; no  significant eosinophilia.    B. No definitive well-developed goblet cell metaplasia identified by routine staining.   C. No dysplasia nor malignancy.                 D. No viral inclusions nor fungal organisms identified.       Lab Results   Component Value Date    HGB 12.3 06/03/2014    MCV 81.3 06/03/2014     06/03/2014    ALT 11 06/03/2014    AST 22 06/03/2014    INR 0.9 06/03/2014    TRIG 287 (H) 06/04/2014      No results found.    REVIEW OF SYSTEMS  Review of Systems   Constitutional:  Negative for activity change, chills, fever and unexpected weight change.   HENT:  Positive for trouble swallowing. Negative for congestion.    Eyes:  Negative for visual disturbance.   Respiratory:  Negative for shortness of breath.    Cardiovascular:  Negative for chest pain and palpitations.   Gastrointestinal:  Positive for abdominal distention. Negative for abdominal pain and blood in stool.        Reflux   Endocrine: Negative for cold intolerance and heat intolerance.   Genitourinary:  Negative for hematuria.   Musculoskeletal:  Negative for gait problem.   Skin:  Negative for color change.   Allergic/Immunologic: Negative for immunocompromised state.   Neurological:  Negative for weakness and light-headedness.   Hematological:  Negative for adenopathy.   Psychiatric/Behavioral:  Negative for sleep disturbance. The patient is not nervous/anxious.          ACTIVE PROBLEMS  Patient Active Problem List    Diagnosis     Gastroesophageal reflux disease with esophagitis [K21.00]     UTI symptoms [R39.9]     Personal history of colonic polyps [Z86.0100]     Gastroesophageal reflux disease without esophagitis [K21.9]     Constipation [K59.00]     Encounter for screening mammogram for malignant neoplasm of breast  [Z12.31]     Screening for colon cancer [Z12.11]     Murphy's esophagus without dysplasia [K22.70]     Urinary incontinence [R32]          PAST MEDICAL HISTORY  Past Medical History:   Diagnosis Date    Murphy  esophagus     Basal cell carcinoma of chest     NECK, LEGS    GERD (gastroesophageal reflux disease)     Hyperlipidemia     Hypertension          SURGICAL HISTORY  Past Surgical History:   Procedure Laterality Date    CATARACT EXTRACTION      COLONOSCOPY  2008    COLONOSCOPY N/A 10/23/2018    Procedure: COLONOSCOPY INTO CECUM AND TERMINAL ILEUM WITH COLD BIOPSY POLYPECTOMIES;  Surgeon: Med Wallace MD;  Location:  [facility];  Service: Gastroenterology    COLONOSCOPY N/A 5/28/2024    Procedure: COLONOSCOPY;  Surgeon: Med Wallace MD;  Location:  [facility];  Service: Gastroenterology;  Laterality: N/A;  diverticulosis, angiodysplasia    ENDOSCOPY N/A 10/23/2018    Procedure: ESOPHAGOGASTRODUODENOSCOPY WITH COLD BIOPSIES;  Surgeon: Med Wallace MD;  Location:  [facility];  Service: Gastroenterology    ENDOSCOPY N/A 5/28/2024    Procedure: ESOPHAGOGASTRODUODENOSCOPY WITH BIOPSY;  Surgeon: Med Wallace MD;  Location:  [facility];  Service: Gastroenterology;  Laterality: N/A;  grossman's esophagus    EYE SURGERY      HYSTERECTOMY      ROTATOR CUFF REPAIR      SKIN TAG REMOVAL      TUBAL ABDOMINAL LIGATION           FAMILY HISTORY  Family History   Problem Relation Age of Onset    Colon polyps Mother     Colon polyps Father     Malig Hyperthermia Neg Hx     Breast cancer Neg Hx          SOCIAL HISTORY  Social History     Occupational History    Not on file   Tobacco Use    Smoking status: Former     Passive exposure: Never    Smokeless tobacco: Never   Vaping Use    Vaping status: Never Used   Substance and Sexual Activity    Alcohol use: No    Drug use: No    Sexual activity: Yes     Partners: Male         CURRENT MEDICATIONS    Current Outpatient Medications:     celecoxib (CeleBREX) 200 MG capsule, Take 1 capsule by mouth Daily., Disp: , Rfl:     Cetirizine HCl (KLS ALLER-HUDSON PO), Take 1 tablet by mouth Daily., Disp: , Rfl:     estradiol (ESTRACE) 0.1  "MG/GM vaginal cream, Insert 1 g into the vagina 2 (Two) Times a Week., Disp: 45 g, Rfl: 6    Hydrocortisone, Perianal, (ANUSOL-HC) 2.5 % rectal cream, Insert  into the rectum 2 (Two) Times a Day As Needed for Hemorrhoids., Disp: 30 g, Rfl: 0    olmesartan-hydrochlorothiazide (BENICAR HCT) 40-12.5 MG per tablet, , Disp: , Rfl:     omeprazole (priLOSEC) 40 MG capsule, Take 1 capsule by mouth Daily., Disp: 90 capsule, Rfl: 3    rosuvastatin (CRESTOR) 5 MG tablet, , Disp: , Rfl:     ALLERGIES  Patient has no known allergies.    VITALS  Vitals:    11/07/24 1121   BP: 120/80   BP Location: Left arm   Patient Position: Sitting   Cuff Size: Adult   Weight: 90.3 kg (199 lb)   Height: 162.6 cm (64\")       PHYSICAL EXAM  Debilities/Disabilities Identified: None  Emotional Behavior: Appropriate  Wt Readings from Last 3 Encounters:   11/07/24 90.3 kg (199 lb)   05/24/24 88.5 kg (195 lb)   11/07/23 90 kg (198 lb 6.4 oz)     Ht Readings from Last 1 Encounters:   11/07/24 162.6 cm (64\")     Body mass index is 34.16 kg/m².  Physical Exam  Constitutional:       General: She is not in acute distress.     Appearance: Normal appearance. She is not ill-appearing.   HENT:      Head: Normocephalic and atraumatic.      Mouth/Throat:      Mouth: Mucous membranes are moist.      Pharynx: No posterior oropharyngeal erythema.   Eyes:      General: No scleral icterus.  Cardiovascular:      Rate and Rhythm: Normal rate and regular rhythm.      Heart sounds: Normal heart sounds.   Pulmonary:      Effort: Pulmonary effort is normal.      Breath sounds: Normal breath sounds.   Abdominal:      General: Abdomen is flat. Bowel sounds are normal. There is no distension.      Palpations: Abdomen is soft. There is no mass.      Tenderness: There is no abdominal tenderness in the right upper quadrant and right lower quadrant. There is no guarding or rebound. Negative signs include Cruz's sign.      Hernia: No hernia is present.   Musculoskeletal:      " Cervical back: Neck supple.   Skin:     General: Skin is warm.      Capillary Refill: Capillary refill takes less than 2 seconds.   Neurological:      General: No focal deficit present.      Mental Status: She is alert and oriented to person, place, and time.   Psychiatric:         Mood and Affect: Mood normal.         Behavior: Behavior normal.         Thought Content: Thought content normal.         Judgment: Judgment normal.         CLINICAL DATA REVIEWED   reviewed previous lab results and integrated with today's visit, reviewed notes from other physicians and/or last GI encounter, reviewed previous endoscopy results and available photos, reviewed surgical pathology results from previous biopsies      ASSESSMENT  Diagnoses and all orders for this visit:    Gastroesophageal reflux disease without esophagitis    Chronic idiopathic constipation    Gastroesophageal reflux disease with esophagitis without hemorrhage  -     omeprazole (priLOSEC) 40 MG capsule; Take 1 capsule by mouth Daily.    Other orders  -     celecoxib (CeleBREX) 200 MG capsule; Take 1 capsule by mouth Daily.          PLAN    CIC/diverticulosis/hemorrhoids: Extensively reviewed fiber and fluids, avoid straining  GERD: Continue daily PPI and add fluids with meals    Return in about 1 year (around 11/7/2025).    I have discussed the above plan with the patient.  They verbalize understanding and are in agreement with the plan.  They have been advised to contact the office for any questions, concerns, or changes related to their health.

## 2025-01-23 DIAGNOSIS — K64.9 HEMORRHOIDS, UNSPECIFIED HEMORRHOID TYPE: ICD-10-CM

## 2025-01-23 RX ORDER — HYDROCORTISONE 25 MG/G
CREAM TOPICAL
Qty: 30 G | Refills: 0 | Status: SHIPPED | OUTPATIENT
Start: 2025-01-23

## 2025-04-01 ENCOUNTER — OFFICE VISIT (OUTPATIENT)
Dept: OBSTETRICS AND GYNECOLOGY | Facility: CLINIC | Age: 70
End: 2025-04-01
Payer: MEDICARE

## 2025-04-01 ENCOUNTER — PATIENT ROUNDING (BHMG ONLY) (OUTPATIENT)
Dept: OBSTETRICS AND GYNECOLOGY | Facility: CLINIC | Age: 70
End: 2025-04-01
Payer: MEDICARE

## 2025-04-01 VITALS
HEIGHT: 64 IN | DIASTOLIC BLOOD PRESSURE: 80 MMHG | BODY MASS INDEX: 34.38 KG/M2 | WEIGHT: 201.4 LBS | SYSTOLIC BLOOD PRESSURE: 142 MMHG

## 2025-04-01 DIAGNOSIS — Z13.89 SCREENING FOR GENITOURINARY CONDITION: ICD-10-CM

## 2025-04-01 DIAGNOSIS — N39.41 URGE INCONTINENCE OF URINE: Primary | ICD-10-CM

## 2025-04-01 LAB
BILIRUB BLD-MCNC: NEGATIVE MG/DL
CLARITY, POC: CLEAR
COLOR UR: YELLOW
GLUCOSE UR STRIP-MCNC: NEGATIVE MG/DL
KETONES UR QL: NEGATIVE
LEUKOCYTE EST, POC: NEGATIVE
NITRITE UR-MCNC: NEGATIVE MG/ML
PH UR: 7 [PH] (ref 5–8)
PROT UR STRIP-MCNC: ABNORMAL MG/DL
RBC # UR STRIP: NEGATIVE /UL
SP GR UR: 1.01 (ref 1–1.03)
UROBILINOGEN UR QL: ABNORMAL

## 2025-04-01 NOTE — PROGRESS NOTES
"      Gracie Topete is a 69 y.o. patient who presents for follow up of   Chief Complaint   Patient presents with    Difficulty Urinating     Pain during and after urinating       HPI very pleasant 69-year-old female new patient to me presents with bladder issues.  Had a TVT done in 2006.  Now has painful urination.  Comes and goes.  Sometimes associated with heavy lifting.  There are times when she has no symptoms whatsoever.  Other times it is painful during urination and shortly thereafter for up to 10 minutes.  Had a urology evaluation including a cystoscopy which was normal.  Concern for erosion of her TVT.    The following portions of the patient's history were reviewed and updated as appropriate: allergies, current medications and problem list.    Review of Systems   Constitutional:  Negative for appetite change, fever and unexpected weight change.   HENT:  Negative for congestion and sore throat.    Respiratory:  Negative for cough and shortness of breath.    Cardiovascular:  Negative for chest pain and palpitations.   Gastrointestinal:  Negative for abdominal distention, abdominal pain, constipation, diarrhea, nausea and vomiting.   Endocrine: Negative.    Genitourinary:  Positive for dysuria and frequency. Negative for dyspareunia, menstrual problem, pelvic pain and vaginal discharge.   Skin: Negative.    Neurological:  Negative for dizziness and syncope.   Hematological: Negative.    Psychiatric/Behavioral:  Negative for dysphoric mood and sleep disturbance. The patient is not nervous/anxious.      /80   Ht 162.6 cm (64.02\")   Wt 91.4 kg (201 lb 6.4 oz)   LMP  (LMP Unknown)   Breastfeeding No   BMI 34.55 kg/m²     Physical Exam  Vitals and nursing note reviewed. Exam conducted with a chaperone present.   Constitutional:       Appearance: She is well-developed.   HENT:      Head: Normocephalic and atraumatic.   Pulmonary:      Effort: Pulmonary effort is normal. No respiratory distress. "   Abdominal:      General: There is no distension.      Palpations: Abdomen is soft. There is no mass.      Tenderness: There is no abdominal tenderness. There is no guarding or rebound.   Genitourinary:     General: Normal vulva.      Exam position: Lithotomy position.      Vagina: No signs of injury. No vaginal discharge, tenderness, bleeding or lesions.      Comments: TVT site clear.  No evidence of vaginal erosion.  Musculoskeletal:         General: Normal range of motion.   Skin:     General: Skin is warm and dry.   Neurological:      Mental Status: She is alert and oriented to person, place, and time.   Psychiatric:         Behavior: Behavior normal.         Thought Content: Thought content normal.         Judgment: Judgment normal.       Assessment/Plan    Diagnoses and all orders for this visit:    1. Urge incontinence of urine (Primary)    2. Screening for genitourinary condition  -     POC Urinalysis Dipstick    Other orders  -     Cancel: POC Urinalysis Dipstick    No evidence of vaginal erosion and a negative cystoscopy so I doubt bladder erosion.  Question if this is not overactive bladder.  Still has to wear pads daily.  Reports no leaking with coughing, sneezing or laughing.  TVT seems to be working well.  Recommend urodynamic testing to rule out overactive bladder or detrusor instability.    Return for Urodynamics, Follow up with me.    I spent 20 minutes caring for Gracie on this date of service. This time includes time spent by me in the following activities: preparing for the visit, performing a medically appropriate examination and/or evaluation, counseling and educating the patient/family/caregiver, documenting information in the medical record, and ordering test(s).     Beni Mckinney MD  4/1/2025  13:32 EDT

## 2025-04-01 NOTE — PROGRESS NOTES
A MY-CHART MESSAGE HAS BEEN SENT TO THE PATIENT [place] [facility] [place]

## 2025-04-08 ENCOUNTER — OFFICE VISIT (OUTPATIENT)
Dept: OBSTETRICS AND GYNECOLOGY | Facility: CLINIC | Age: 70
End: 2025-04-08
Payer: MEDICARE

## 2025-04-08 DIAGNOSIS — N39.41 URGE INCONTINENCE OF URINE: Primary | ICD-10-CM

## 2025-04-08 RX ORDER — OXYBUTYNIN CHLORIDE 5 MG/1
5 TABLET ORAL 3 TIMES DAILY
Qty: 90 TABLET | Refills: 2 | Status: SHIPPED | OUTPATIENT
Start: 2025-04-08 | End: 2026-04-08

## 2025-04-08 NOTE — PROGRESS NOTES
69-year-old female with previous TVT presents for new onset incontinence.  Thought she had an erosion.  Saw urology 2 to 3 years ago and had cystoscopy without evidence of bladder or urethral perforation.  Exam 1 to 2 weeks ago showed no evidence of vaginal erosion.  He presents today to have urodynamic testing to rule out overactive bladder with urge incontinence.  Verbal consent obtained.  Sterile technique a red rubber catheter was placed and postvoid residual of 10 cc was obtained.  Next attempted to place the dual-lumen catheter and met resistance.  I do not think this is likely TVT erosion but may be bladder instability.  Was significantly uncomfortable therefore procedure was aborted.  Recommend treatment with 3 months of anticholinergics and referral to urogynecology which will take at least that long to get her a.m.  Has taken Myrbetriq in the past with relief.  Not sure why she stopped but I think this might be overactive bladder.  Patient agreeable with plan.    Beni Mckinney MD

## 2025-07-25 ENCOUNTER — OFFICE VISIT (OUTPATIENT)
Dept: OBSTETRICS AND GYNECOLOGY | Facility: CLINIC | Age: 70
End: 2025-07-25
Payer: MEDICARE

## 2025-07-25 VITALS
SYSTOLIC BLOOD PRESSURE: 122 MMHG | HEIGHT: 64 IN | BODY MASS INDEX: 34.83 KG/M2 | DIASTOLIC BLOOD PRESSURE: 82 MMHG | WEIGHT: 204 LBS

## 2025-07-25 DIAGNOSIS — Z13.89 SCREENING FOR GENITOURINARY CONDITION: ICD-10-CM

## 2025-07-25 DIAGNOSIS — L98.499 SUPERFICIAL ULCERATIVE LESION: Primary | ICD-10-CM

## 2025-07-25 RX ORDER — VALACYCLOVIR HYDROCHLORIDE 1 G/1
1000 TABLET, FILM COATED ORAL 2 TIMES DAILY
Qty: 20 TABLET | Refills: 0 | Status: SHIPPED | OUTPATIENT
Start: 2025-07-25 | End: 2025-08-04

## 2025-07-25 NOTE — PROGRESS NOTES
"Subjective     Chief Complaint   Patient presents with    Vaginal Pain       Gracie Topete is a 69 y.o.  whose LMP is No LMP recorded (lmp unknown). Patient has had a hysterectomy.. She presents c/o a blistering rash on her buttocks near anus. She feels like the area is swollen or a lump under there. She first noticed a white dot. The area is painful with touch. She reports it stings a little bit. She put on aquaphor with no relief.     HPI    HPI    The following portions of the patient's history were reviewed and updated as appropriate:vital signs, allergies, current medications, past medical history, past social history, past surgical history, and problem list      Review of Systems     Review of Systems   Constitutional: Negative.    Genitourinary:  Positive for vaginal pain.        Swollen lump        Objective      /82   Ht 162.6 cm (64\")   Wt 92.5 kg (204 lb)   LMP  (LMP Unknown)   Breastfeeding No   BMI 35.02 kg/m²     Physical Exam    Physical Exam  Vitals and nursing note reviewed.   Constitutional:       Appearance: Normal appearance.   Genitourinary:         Comments: Ulcerative lesion   Musculoskeletal:         General: Normal range of motion.   Skin:     General: Skin is warm and dry.   Neurological:      General: No focal deficit present.      Mental Status: She is alert and oriented to person, place, and time.   Psychiatric:         Mood and Affect: Mood normal.         Behavior: Behavior normal.         Lab Review   Labs: Urinalysis - with micro     Imaging   No data reviewed    Assessment/Plan     Ulcerative lesion- Uncertain cause. Consider HSV as dx. Check NuSwab ulcer panel. Check HSV 1, HSV 2 and RPR.  ERX Valtrex. Await results.     RTO PRN     Epic LOC calculator was utilized to code this visit.       Donna Townsend, APRN  2025        "

## 2025-07-26 LAB
HSV1 IGG SERPL QL IA: NON REACTIVE
HSV2 IGG SERPL QL IA: NON REACTIVE
RPR SER QL: NON REACTIVE

## 2025-07-28 LAB
HSV1 DNA SPEC QL NAA+PROBE: POSITIVE
HSV2 DNA SPEC QL NAA+PROBE: NEGATIVE

## 2025-07-29 PROBLEM — L98.499: Status: ACTIVE | Noted: 2025-07-29

## (undated) DEVICE — JACKT LAB F/R KNIT CUFF/COLR XLG BLU

## (undated) DEVICE — SINGLE-USE BIOPSY FORCEPS: Brand: RADIAL JAW 4

## (undated) DEVICE — THE TORRENT IRRIGATION SCOPE CONNECTOR IS USED WITH THE TORRENT IRRIGATION TUBING TO PROVIDE IRRIGATION FLUIDS SUCH AS STERILE WATER DURING GASTROINTESTINAL ENDOSCOPIC PROCEDURES WHEN USED IN CONJUNCTION WITH AN IRRIGATION PUMP (OR ELECTROSURGICAL UNIT).: Brand: TORRENT

## (undated) DEVICE — SYR LL W/SCALE/MARK 3ML STRL

## (undated) DEVICE — SOL IRR H2O BTL 1000ML STRL

## (undated) DEVICE — KT ORCA ORCAPOD DISP STRL

## (undated) DEVICE — Device

## (undated) DEVICE — TUBING, SUCTION, 1/4" X 10', STRAIGHT: Brand: MEDLINE

## (undated) DEVICE — CANN NASL CO2 TRULINK W/O2 A/

## (undated) DEVICE — BITEBLOCK OMNI BLOC

## (undated) DEVICE — LINER SURG CANSTR SXN S/RIGD 1500CC

## (undated) DEVICE — VIAL FORMALIN CAP 10P 40ML

## (undated) DEVICE — ADAPT CLN BIOGUARD AIR/H2O DISP

## (undated) DEVICE — Device: Brand: DEFENDO AIR/WATER/SUCTION AND BIOPSY VALVE

## (undated) DEVICE — GLV SURG SENSICARE PI MIC PF SZ8 LF STRL

## (undated) DEVICE — BW-412T DISP COMBO CLEANING BRUSH: Brand: SINGLE USE COMBINATION CLEANING BRUSH

## (undated) DEVICE — FRCP BX RADJAW4 NDL 2.8 240CM LG OG BX40

## (undated) DEVICE — THE BITE BLOCK MAXI, LATEX FREE STRAP IS USED TO PROTECT THE ENDOSCOPE INSERTION TUBE FROM BEING BITTEN BY THE PATIENT.